# Patient Record
Sex: FEMALE | Race: WHITE | NOT HISPANIC OR LATINO | Employment: FULL TIME | URBAN - METROPOLITAN AREA
[De-identification: names, ages, dates, MRNs, and addresses within clinical notes are randomized per-mention and may not be internally consistent; named-entity substitution may affect disease eponyms.]

---

## 2019-03-11 ENCOUNTER — OFFICE VISIT (OUTPATIENT)
Dept: INTERNAL MEDICINE | Facility: CLINIC | Age: 24
End: 2019-03-11
Payer: COMMERCIAL

## 2019-03-11 VITALS
HEART RATE: 102 BPM | BODY MASS INDEX: 22.66 KG/M2 | OXYGEN SATURATION: 99 % | WEIGHT: 127.9 LBS | HEIGHT: 63 IN | TEMPERATURE: 97.3 F | SYSTOLIC BLOOD PRESSURE: 108 MMHG | RESPIRATION RATE: 23 BRPM | DIASTOLIC BLOOD PRESSURE: 64 MMHG

## 2019-03-11 DIAGNOSIS — Z00.00 ROUTINE HISTORY AND PHYSICAL EXAMINATION OF ADULT: Primary | ICD-10-CM

## 2019-03-11 LAB — HGB BLD-MCNC: 13.1 G/DL (ref 11.7–15.7)

## 2019-03-11 PROCEDURE — 36415 COLL VENOUS BLD VENIPUNCTURE: CPT | Performed by: INTERNAL MEDICINE

## 2019-03-11 PROCEDURE — 80061 LIPID PANEL: CPT | Performed by: INTERNAL MEDICINE

## 2019-03-11 PROCEDURE — 85018 HEMOGLOBIN: CPT | Performed by: INTERNAL MEDICINE

## 2019-03-11 PROCEDURE — 80053 COMPREHEN METABOLIC PANEL: CPT | Performed by: INTERNAL MEDICINE

## 2019-03-11 PROCEDURE — 99385 PREV VISIT NEW AGE 18-39: CPT | Performed by: INTERNAL MEDICINE

## 2019-03-11 SDOH — HEALTH STABILITY: MENTAL HEALTH: HOW OFTEN DO YOU HAVE A DRINK CONTAINING ALCOHOL?: NEVER

## 2019-03-11 ASSESSMENT — ENCOUNTER SYMPTOMS
DIARRHEA: 0
FEVER: 0
HEMATURIA: 0
NAUSEA: 0
SHORTNESS OF BREATH: 0
FREQUENCY: 0
SORE THROAT: 0
DIZZINESS: 0
BREAST MASS: 0
NERVOUS/ANXIOUS: 0
MYALGIAS: 0
PARESTHESIAS: 0
HEARTBURN: 0
WEAKNESS: 0
ABDOMINAL PAIN: 0
DYSURIA: 0
COUGH: 0
PALPITATIONS: 0
JOINT SWELLING: 0
CHILLS: 0
ARTHRALGIAS: 0
HEMATOCHEZIA: 0
CONSTIPATION: 0

## 2019-03-11 ASSESSMENT — MIFFLIN-ST. JEOR: SCORE: 1291.34

## 2019-03-11 NOTE — NURSING NOTE
"/64   Pulse 128   Temp 97.3  F (36.3  C) (Oral)   Resp 23   Ht 1.588 m (5' 2.5\")   Wt 58 kg (127 lb 14.4 oz)   LMP 03/11/2019   SpO2 99%   BMI 23.02 kg/m    Patient in for annual female physical.  Ana Maria Batista CMA    "

## 2019-03-11 NOTE — LETTER
March 25, 2019      Karen Soliz  [id number]   [place] [address]        Dear ,    Your lab results came back within acceptable limits.     Resulted Orders   Hemoglobin   Result Value Ref Range    Hemoglobin 13.1 11.7 - 15.7 g/dL   Comprehensive metabolic panel   Result Value Ref Range    Sodium 142 133 - 144 mmol/L    Potassium 4.2 3.4 - 5.3 mmol/L    Chloride 109 94 - 109 mmol/L    Carbon Dioxide 29 20 - 32 mmol/L    Anion Gap 4 3 - 14 mmol/L    Glucose 75 70 - 99 mg/dL    Urea Nitrogen 11 7 - 30 mg/dL    Creatinine 0.54 0.52 - 1.04 mg/dL    GFR Estimate >90 >60 mL/min/[1.73_m2]      Comment:      Non  GFR Calc  Starting 12/18/2018, serum creatinine based estimated GFR (eGFR) will be   calculated using the Chronic Kidney Disease Epidemiology Collaboration   (CKD-EPI) equation.      GFR Estimate If Black >90 >60 mL/min/[1.73_m2]      Comment:       GFR Calc  Starting 12/18/2018, serum creatinine based estimated GFR (eGFR) will be   calculated using the Chronic Kidney Disease Epidemiology Collaboration   (CKD-EPI) equation.      Calcium 9.1 8.5 - 10.1 mg/dL    Bilirubin Total 0.3 0.2 - 1.3 mg/dL    Albumin 3.9 3.4 - 5.0 g/dL    Protein Total 7.2 6.8 - 8.8 g/dL    Alkaline Phosphatase 73 40 - 150 U/L    ALT 24 0 - 50 U/L    AST 22 0 - 45 U/L   Lipid panel reflex to direct LDL Fasting   Result Value Ref Range    Cholesterol 133 <200 mg/dL    Triglycerides 23 <150 mg/dL    HDL Cholesterol 60 >49 mg/dL    LDL Cholesterol Calculated 68 <100 mg/dL      Comment:      Desirable:       <100 mg/dl    Non HDL Cholesterol 73 <130 mg/dL       If you have any questions or concerns, please call the clinic at the number listed above.       Sincerely,        Melina Desouza MD

## 2019-03-11 NOTE — PROGRESS NOTES
SUBJECTIVE:   CC: Karen Soliz is an 24 year old woman who presents for preventive health visit.     Physical   Annual:     Getting at least 3 servings of Calcium per day:  Yes    Bi-annual eye exam:  Yes    Dental care twice a year:  NO    Sleep apnea or symptoms of sleep apnea:  None    Diet:  Low fat/cholesterol    Frequency of exercise:  2-3 days/week    Duration of exercise:  15-30 minutes    Taking medications regularly:  Yes    Medication side effects:  None    Additional concerns today:  No    PHQ-2 Total Score: 0    Pt declines pap smear today      Today's PHQ-2 Score:   PHQ-2 (  [employer]) 3/11/2019   Q1: Little interest or pleasure in doing things 0   Q2: Feeling down, depressed or hopeless 0   PHQ-2 Score 0   Q1: Little interest or pleasure in doing things Not at all   Q2: Feeling down, depressed or hopeless Not at all   PHQ-2 Score 0       Abuse: Current or Past(Physical, Sexual or Emotional)- No  Do you feel safe in your environment? No      History reviewed. No pertinent past medical history.    Past Surgical History:   Procedure Laterality Date      SECTION         No current outpatient medications on file.       Family History   Problem Relation Age of Onset     Diabetes Mother      Hypertension Mother      Hyperlipidemia Mother        Social History     Tobacco Use     Smoking status: Never Smoker     Smokeless tobacco: Former User   Substance Use Topics     Alcohol use: No     Frequency: Never     Alcohol Use 3/11/2019   If you drink alcohol do you typically have greater than 3 drinks per day OR greater than 7 drinks per week? Not Applicable   No flowsheet data found.    Reviewed orders with patient.  Reviewed health maintenance and updated orders accordingly - Yes       Mammogram not appropriate for this patient based on age.       History of abnormal Pap smear: NO - age 21-29 PAP every 3 years recommended     Reviewed and updated as needed this visit by clinical staff  Tobacco   "Allergies  Meds  Med Hx  Surg Hx  Fam Hx  Soc Hx        Reviewed and updated as needed this visit by Provider            Review of Systems   Constitutional: Negative for chills and fever.   HENT: Negative for congestion, ear pain and sore throat.    Eyes: Negative for visual disturbance.   Respiratory: Negative for cough and shortness of breath.    Cardiovascular: Negative for chest pain, palpitations and peripheral edema.   Gastrointestinal: Negative for abdominal pain, constipation, diarrhea, heartburn, hematochezia and nausea.   Breasts:  Negative for tenderness, breast mass and discharge.   Genitourinary: Negative for dysuria, frequency, genital sores, hematuria, pelvic pain, urgency, vaginal bleeding and vaginal discharge.   Musculoskeletal: Negative for arthralgias, joint swelling and myalgias.   Skin: Negative for rash.   Neurological: Negative for dizziness, weakness and paresthesias.   Psychiatric/Behavioral: Negative for mood changes. The patient is not nervous/anxious.         OBJECTIVE:   /64   Pulse 128   Temp 97.3  F (36.3  C) (Oral)   Resp 23   Ht 1.588 m (5' 2.5\")   Wt 58 kg (127 lb 14.4 oz)   LMP 03/11/2019   SpO2 99%   BMI 23.02 kg/m    Physical Exam  GENERAL: healthy, alert and no distress  EYES: Eyes grossly normal to inspection, PERRL and conjunctivae and sclerae normal  HENT: ear canals and TM's normal, nose and mouth without ulcers or lesions  NECK: no adenopathy, no asymmetry, masses, or scars and thyroid normal to palpation  RESP: lungs clear to auscultation - no rales, rhonchi or wheezes  BREAST: normal without masses, tenderness or nipple discharge and no palpable axillary masses or adenopathy  CV: regular rate and rhythm, normal S1 S2, no S3 or S4, no murmur, click or rub, no peripheral edema and peripheral pulses strong  ABDOMEN: soft, nontender, no hepatosplenomegaly, no masses and bowel sounds normal  MS: no gross musculoskeletal defects noted, no edema  NEURO: " "Normal strength and tone, mentation intact and speech normal  PSYCH: mentation appears normal, affect normal/bright      ASSESSMENT/PLAN:     (Z00.00) Routine history and physical examination of adult  (primary encounter diagnosis)  Plan: Hemoglobin, Comprehensive metabolic panel,         Lipid panel reflex to direct LDL Fasting            COUNSELING:  Reviewed preventive health counseling, as reflected in patient instructions       Regular exercise       Healthy diet/nutrition    BP Readings from Last 1 Encounters:   03/11/19 108/64     Estimated body mass index is 23.02 kg/m  as calculated from the following:    Height as of this encounter: 1.588 m (5' 2.5\").    Weight as of this encounter: 58 kg (127 lb 14.4 oz).         reports that  has never smoked. she has never used smokeless tobacco.      Counseling Resources:  ATP IV Guidelines  Pooled Cohorts Equation Calculator  Breast Cancer Risk Calculator  FRAX Risk Assessment  ICSI Preventive Guidelines  Dietary Guidelines for Americans, 2010  USDA's MyPlate  ASA Prophylaxis  Lung CA Screening    Melina Desouza MD  [facility]  "

## 2019-03-12 LAB
ALBUMIN SERPL-MCNC: 3.9 G/DL (ref 3.4–5)
ALP SERPL-CCNC: 73 U/L (ref 40–150)
ALT SERPL W P-5'-P-CCNC: 24 U/L (ref 0–50)
ANION GAP SERPL CALCULATED.3IONS-SCNC: 4 MMOL/L (ref 3–14)
AST SERPL W P-5'-P-CCNC: 22 U/L (ref 0–45)
BILIRUB SERPL-MCNC: 0.3 MG/DL (ref 0.2–1.3)
BUN SERPL-MCNC: 11 MG/DL (ref 7–30)
CALCIUM SERPL-MCNC: 9.1 MG/DL (ref 8.5–10.1)
CHLORIDE SERPL-SCNC: 109 MMOL/L (ref 94–109)
CHOLEST SERPL-MCNC: 133 MG/DL
CO2 SERPL-SCNC: 29 MMOL/L (ref 20–32)
CREAT SERPL-MCNC: 0.54 MG/DL (ref 0.52–1.04)
GFR SERPL CREATININE-BSD FRML MDRD: >90 ML/MIN/{1.73_M2}
GLUCOSE SERPL-MCNC: 75 MG/DL (ref 70–99)
HDLC SERPL-MCNC: 60 MG/DL
LDLC SERPL CALC-MCNC: 68 MG/DL
NONHDLC SERPL-MCNC: 73 MG/DL
POTASSIUM SERPL-SCNC: 4.2 MMOL/L (ref 3.4–5.3)
PROT SERPL-MCNC: 7.2 G/DL (ref 6.8–8.8)
SODIUM SERPL-SCNC: 142 MMOL/L (ref 133–144)
TRIGL SERPL-MCNC: 23 MG/DL

## 2019-03-26 ENCOUNTER — TELEPHONE (OUTPATIENT)
Dept: INTERNAL MEDICINE | Facility: CLINIC | Age: 24
End: 2019-03-26

## 2019-03-27 NOTE — TELEPHONE ENCOUNTER
Urine tests not completed at our clinic. Notified patient and she and  agreed.  Ana Maria Batista, CMA

## 2019-03-27 NOTE — TELEPHONE ENCOUNTER
Please call Karen as soon as possible regarding her labwork. She is very concerned about her urine tests/is she pregnant? Is all she kept asking. She can be reached at [phone].

## 2019-04-28 ENCOUNTER — NURSE TRIAGE (OUTPATIENT)
Dept: NURSING | Facility: CLINIC | Age: 24
End: 2019-04-28

## 2019-05-28 ENCOUNTER — OFFICE VISIT (OUTPATIENT)
Dept: INTERNAL MEDICINE | Facility: CLINIC | Age: 24
End: 2019-05-28
Payer: COMMERCIAL

## 2019-05-28 VITALS
DIASTOLIC BLOOD PRESSURE: 76 MMHG | BODY MASS INDEX: 22.32 KG/M2 | WEIGHT: 126 LBS | HEIGHT: 63 IN | TEMPERATURE: 98.6 F | SYSTOLIC BLOOD PRESSURE: 104 MMHG | OXYGEN SATURATION: 94 % | RESPIRATION RATE: 16 BRPM | HEART RATE: 66 BPM

## 2019-05-28 DIAGNOSIS — M54.42 ACUTE BILATERAL LOW BACK PAIN WITH LEFT-SIDED SCIATICA: Primary | ICD-10-CM

## 2019-05-28 DIAGNOSIS — R63.4 WEIGHT LOSS: ICD-10-CM

## 2019-05-28 PROCEDURE — 99214 OFFICE O/P EST MOD 30 MIN: CPT | Performed by: NURSE PRACTITIONER

## 2019-05-28 ASSESSMENT — MIFFLIN-ST. JEOR: SCORE: 1282.72

## 2019-05-28 NOTE — PROGRESS NOTES
Subjective     Karen Soliz is a 24 year old female who presents to clinic today for the following health issues:    HPI   Back Pain       Duration: 2 weeks        Specific cause: none    Description:   Location of pain: low back bilateral  Character of pain: dull ache and waxing and waning  Pain radiation:radiates into the left buttocks and radiates into the left leg  New numbness or weakness in legs, not attributed to pain:  no     Intensity: moderate    History:   Pain interferes with job: YES, unable to stand long hours  History of back problems: recurrent self limited episodes of low back pain in the past  Any previous MRI or X-rays: Yes--at [facility].  Date 19  Sees a specialist for back pain:  No  Therapies tried without relief: prednisone x 1 week, some relief    Alleviating factors:   Improved by: see above      Precipitating factors:  Worsened by: Standing          Accompanying Signs & Symptoms:  Risk of Fracture:  None  Risk of Cauda Equina:  None  Risk of Infection:  None  Risk of Cancer:  None  Risk of Ankylosing Spondylitis:  Onset at age <35, male, AND morning back stiffness. no                Concern for wt loss      Duration: past few months    Description (location/character/radiation): pt reported wt loss, poor appetite.    Intensity:  mild    Accompanying signs and symptoms: none    History (similar episodes/previous evaluation): None    Precipitating or alleviating factors: None    Therapies tried and outcome: None       There is no problem list on file for this patient.    Past Surgical History:   Procedure Laterality Date      SECTION         Social History     Tobacco Use     Smoking status: Never Smoker     Smokeless tobacco: Never Used   Substance Use Topics     Alcohol use: No     Frequency: Never     Family History   Problem Relation Age of Onset     Diabetes Mother      Hypertension Mother      Hyperlipidemia Mother          No current outpatient medications on file.  "    BP Readings from Last 3 Encounters:   05/28/19 104/76   03/11/19 108/64    Wt Readings from Last 3 Encounters:   05/28/19 57.2 kg (126 lb)   03/11/19 58 kg (127 lb 14.4 oz)                      Reviewed and updated as needed this visit by Provider         Review of Systems   ROS COMP: Constitutional, HEENT, cardiovascular, pulmonary, gi and gu systems are negative, except as otherwise noted.      Objective    /76 (BP Location: Right arm, Patient Position: Sitting, Cuff Size: Adult Regular)   Pulse 66   Temp 98.6  F (37  C) (Oral)   Resp 16   Ht 1.588 m (5' 2.5\")   Wt 57.2 kg (126 lb)   LMP 05/10/2019 (Exact Date)   SpO2 94%   BMI 22.68 kg/m    Body mass index is 22.68 kg/m .  Physical Exam   GENERAL: healthy, alert and no distress  BACK: no CVA tenderness, no paralumbar tenderness  Negative SLR, DTR intact            Assessment & Plan      (M54.42) Acute bilateral low back pain with left-sided sciatica  (primary encounter diagnosis)  Comment:   Plan: NSAIDs, heat, has f/u with spine specialist through [facility]    (R63.4) Weight loss  Comment:   Plan: reassured, minimal wt flucuation.  Healthy diet, regular exercise.            Michelle Snyder NP  [facility]        "

## 2019-11-07 ENCOUNTER — OFFICE VISIT (OUTPATIENT)
Dept: INTERNAL MEDICINE | Facility: CLINIC | Age: 24
End: 2019-11-07
Payer: COMMERCIAL

## 2019-11-07 VITALS
OXYGEN SATURATION: 98 % | SYSTOLIC BLOOD PRESSURE: 98 MMHG | WEIGHT: 135.3 LBS | HEIGHT: 63 IN | HEART RATE: 82 BPM | RESPIRATION RATE: 16 BRPM | TEMPERATURE: 98.3 F | DIASTOLIC BLOOD PRESSURE: 62 MMHG | BODY MASS INDEX: 23.97 KG/M2

## 2019-11-07 DIAGNOSIS — L20.9 ATOPIC DERMATITIS, UNSPECIFIED TYPE: Primary | ICD-10-CM

## 2019-11-07 PROCEDURE — 99213 OFFICE O/P EST LOW 20 MIN: CPT | Performed by: NURSE PRACTITIONER

## 2019-11-07 RX ORDER — CLOTRIMAZOLE AND BETAMETHASONE DIPROPIONATE 10; .64 MG/G; MG/G
CREAM TOPICAL 2 TIMES DAILY
Qty: 45 G | Refills: 1 | Status: SHIPPED | OUTPATIENT
Start: 2019-11-07 | End: 2020-06-23

## 2019-11-07 RX ORDER — HYDROXYZINE PAMOATE 50 MG/1
50 CAPSULE ORAL 3 TIMES DAILY PRN
Qty: 30 CAPSULE | Refills: 0 | Status: SHIPPED | OUTPATIENT
Start: 2019-11-07 | End: 2020-06-23

## 2019-11-07 ASSESSMENT — MIFFLIN-ST. JEOR: SCORE: 1324.91

## 2019-11-07 NOTE — NURSING NOTE
"patient states that she has been having itching in vaginal/inner thigh area for 2 weeks.  BP 98/62 (BP Location: Right arm, Patient Position: Sitting, Cuff Size: Adult Regular)   Pulse 82   Temp 98.3  F (36.8  C) (Oral)   Resp 16   Ht 1.588 m (5' 2.5\")   Wt 61.4 kg (135 lb 4.8 oz)   LMP 10/20/2019 (Approximate)   SpO2 98%   BMI 24.35 kg/m      "

## 2019-11-07 NOTE — PROGRESS NOTES
"Subjective     Karen Soliz is a 24 year old female who presents to clinic today for the following health issues:    HPI   Itchy skin      Duration: 1 month    Description (location/character/radiation): itching lower abdomin and vulva    Intensity:  mild, moderate    Accompanying signs and symptoms: no rash, vag discharge    History (similar episodes/previous evaluation): None    Precipitating or alleviating factors: None, pt feels it has something to do with her scar from  3 years ago.    Therapies tried and outcome: None         There is no problem list on file for this patient.    Past Surgical History:   Procedure Laterality Date      SECTION         Social History     Tobacco Use     Smoking status: Never Smoker     Smokeless tobacco: Never Used   Substance Use Topics     Alcohol use: No     Frequency: Never     Family History   Problem Relation Age of Onset     Diabetes Mother      Hypertension Mother      Hyperlipidemia Mother          Current Outpatient Medications   Medication Sig Dispense Refill     clotrimazole-betamethasone (LOTRISONE) 1-0.05 % external cream Apply topically 2 times daily 45 g 1     hydrOXYzine (VISTARIL) 50 MG capsule Take 1 capsule (50 mg) by mouth 3 times daily as needed for itching 30 capsule 0     BP Readings from Last 3 Encounters:   19 98/62   19 104/76   19 108/64    Wt Readings from Last 3 Encounters:   19 61.4 kg (135 lb 4.8 oz)   19 57.2 kg (126 lb)   19 58 kg (127 lb 14.4 oz)                      Reviewed and updated as needed this visit by Provider         Review of Systems   ROS COMP: Constitutional, HEENT, cardiovascular, pulmonary, gi and gu systems are negative, except as otherwise noted.      Objective    BP 98/62 (BP Location: Right arm, Patient Position: Sitting, Cuff Size: Adult Regular)   Pulse 82   Temp 98.3  F (36.8  C) (Oral)   Resp 16   Ht 1.588 m (5' 2.5\")   Wt 61.4 kg (135 lb 4.8 oz)   LMP 10/20/2019 " (Approximate)   SpO2 98%   BMI 24.35 kg/m    Body mass index is 24.35 kg/m .  Physical Exam   GENERAL: healthy, alert and no distress  SKIN: no suspicious lesions or rashes to lower abd, groins, vulva            Assessment & Plan       ICD-10-CM    1. Atopic dermatitis, unspecified type L20.9 clotrimazole-betamethasone (LOTRISONE) 1-0.05 % external cream     hydrOXYzine (VISTARIL) 50 MG capsule          Gentle soaps, moisturizers after bathing  Call if not improving.    Michelle Snyder, DERECK  [facility]

## 2020-04-26 ENCOUNTER — NURSE TRIAGE (OUTPATIENT)
Dept: NURSING | Facility: CLINIC | Age: 25
End: 2020-04-26

## 2020-04-26 NOTE — TELEPHONE ENCOUNTER
Tooth pain - HA, sharp pain around tooth. 10/10.     Per protocol - advised ED evaluation.    COVID 19 Nurse Triage Plan/Patient Instructions    Please be aware that novel coronavirus (COVID-19) may be circulating in the community. If you develop symptoms such as fever, cough, or SOB or if you have concerns about the presence of another infection including coronavirus (COVID-19), please contact your health care provider or visit [email or web address].     Disposition/Instructions    Patient to go to ED and follow protocol based instructions. Follow System Ambulatory Workflow for COVID 19.     Bring Your Own Device:  Please also bring your smart device(s) (smart phones, tablets, laptops) and their charging cables for your personal use and to communicate with your care team during your visit.    Thank you for limiting contact with others, wearing a simple mask to cover your cough, practice good hand hygiene habits and accessing our virtual services where possible to limit the spread of this virus.    For more information about COVID19 and options for caring for yourself at home, please visit the CDC website at https://www.cdc.gov/coronavirus/2019-ncov/about/steps-when-sick.html  For more options for care at [facility], please visit our website at [email or web address]    For more information, please use the [place] Department of Health COVID-19 Website: https://www.health.[facility].[place]/diseases/coronavirus/index.html  [place] Department of Health ([facility]) COVID-19 Hotlines (Interpreters available):      Health questions: Phone Number: [phone] or [phone] and Hours: 7 a.m. to 7 p.m.    Schools and  questions: Phone Number: [phone] or [phone] and Hours 7 a.m. to 7 p.m.    Ilana Baxter RN on 4/26/2020 at 11:06 AM    Reason for Disposition    [1] SEVERE pain (e.g., excruciating, unable to do any normal activities) AND [2] not improved 2 hours after pain  "medicine    Additional Information    Negative: Shock suspected (e.g., cold/pale/clammy skin, too weak to stand, low BP, rapid pulse)    Negative: [1] Similar pain previously AND [2] it was from \"heart attack\"    Negative: [1] Similar pain previously AND [2] it was from \"angina\" AND [3] not relieved by nitroglycerin    Negative: Sounds like a life-threatening emergency to the triager    Negative: Chest pain    Negative: Toothache followed tooth injury    Negative: Toothache or mouth pain after tooth extraction    Negative: Canker sore (i.e., aphthous ulcer)    Negative: Tongue is very swollen and tender    Negative: [1] Face is swollen AND [2] fever    Negative: Patient sounds very sick or weak to the triager    Protocols used: TOOTHACHE-A-AH      "

## 2020-06-23 ENCOUNTER — OFFICE VISIT (OUTPATIENT)
Dept: INTERNAL MEDICINE | Facility: CLINIC | Age: 25
End: 2020-06-23
Payer: COMMERCIAL

## 2020-06-23 ENCOUNTER — ANCILLARY PROCEDURE (OUTPATIENT)
Dept: GENERAL RADIOLOGY | Facility: CLINIC | Age: 25
End: 2020-06-23
Attending: INTERNAL MEDICINE
Payer: COMMERCIAL

## 2020-06-23 VITALS
HEART RATE: 66 BPM | BODY MASS INDEX: 26.82 KG/M2 | OXYGEN SATURATION: 98 % | WEIGHT: 149 LBS | SYSTOLIC BLOOD PRESSURE: 122 MMHG | DIASTOLIC BLOOD PRESSURE: 73 MMHG | RESPIRATION RATE: 16 BRPM

## 2020-06-23 DIAGNOSIS — M79.662 PAIN OF LEFT LOWER LEG: ICD-10-CM

## 2020-06-23 DIAGNOSIS — M79.662 PAIN OF LEFT LOWER LEG: Primary | ICD-10-CM

## 2020-06-23 PROCEDURE — 99214 OFFICE O/P EST MOD 30 MIN: CPT | Performed by: INTERNAL MEDICINE

## 2020-06-23 PROCEDURE — 73590 X-RAY EXAM OF LOWER LEG: CPT | Mod: LT

## 2020-06-23 RX ORDER — CEPHALEXIN 500 MG/1
500 CAPSULE ORAL 2 TIMES DAILY
Qty: 20 CAPSULE | Refills: 0 | Status: SHIPPED | OUTPATIENT
Start: 2020-06-23 | End: 2020-12-10

## 2020-06-23 NOTE — PROGRESS NOTES
Patient's instructions / PLAN:                                                        Plan:  1.  XRay today - [address]   2. Ortho referral. . Please call [phone] to make an appointment    3. Try to keep leg elevated  4. Cephalexin 500 mg twice a day - antibiotic       ASSESSMENT & PLAN:                                                      (A87.215) Pain of left lower leg  (primary encounter diagnosis)  Comment: I wonder if she has a mild infection in the hematoma - Keflex  Plan: XR Tibia & Fibula Left 2 Views, Orthopedic &         Spine  Referral               Chief Complaint:                                                        L LE pain    SUBJECTIVE:                                                    History of present illness     L LE pain  -- she fell about 2 months ago, she had laceration on the ant L lower leg. The laceration heals but she states that the swelling persists and it gets worse and painful if she walks a lot. She states she is worried about the leg and infection. No fever  --exam: scar of the old laceration and it looks healed very well. There is a hematoma on the anterior shin, mild tender. No cellulitis.     ROS:                                                      ROS: negative for fever, chills, cough, wheezes, chest pain, shortness of breath, vomiting, abdominal pain, Pos for leg swelling - as above         OBJECTIVE:                                                    Physical Exam :    Blood pressure 122/73, pulse 66, resp. rate 16, weight 67.6 kg (149 lb), SpO2 98 %, not currently breastfeeding.   NAD, appears comfortable  Skin: no rashes   Chest: clear to auscultation bilaterally, good respiratory effort  Heart: S1 S2, RRR, no mgr appreciated  Abdomen: soft, not tender,   Extremities: --exam: scar of the old laceration and it looks healed very well. There is a hematoma on the anterior shin, mild tender. No cellulitis.   Neurologic: A, Ox3, no focal signs  appreciated    PMHx: reviewed  No past medical history on file.   PSHx: reviewed  Past Surgical History:   Procedure Laterality Date      SECTION          Meds: reviewed  No current outpatient medications on file.       Soc Hx: reviewed  Fam Hx: reviewed          Babita Wright MD  Internal Medicine

## 2020-06-23 NOTE — PATIENT INSTRUCTIONS
Plan:  1.  XRay today - [address]   2. Ortho referral. . Please call [phone] to make an appointment    3. Try to keep leg elevated  4. Cephalexin 500 mg twice a day - antibiotic

## 2020-06-30 ENCOUNTER — OFFICE VISIT (OUTPATIENT)
Dept: ORTHOPEDICS | Facility: CLINIC | Age: 25
End: 2020-06-30
Attending: INTERNAL MEDICINE
Payer: COMMERCIAL

## 2020-06-30 VITALS
DIASTOLIC BLOOD PRESSURE: 64 MMHG | SYSTOLIC BLOOD PRESSURE: 118 MMHG | HEIGHT: 63 IN | BODY MASS INDEX: 26.4 KG/M2 | WEIGHT: 149 LBS

## 2020-06-30 DIAGNOSIS — M79.662 PAIN OF LEFT LOWER LEG: ICD-10-CM

## 2020-06-30 DIAGNOSIS — S80.12XA CONTUSION OF LEFT LOWER LEG, INITIAL ENCOUNTER: Primary | ICD-10-CM

## 2020-06-30 PROCEDURE — 99243 OFF/OP CNSLTJ NEW/EST LOW 30: CPT | Performed by: FAMILY MEDICINE

## 2020-06-30 ASSESSMENT — MIFFLIN-ST. JEOR: SCORE: 1382.05

## 2020-06-30 NOTE — PROGRESS NOTES
"ASSESSMENT & PLAN    1. Contusion of left lower leg, initial encounter    2. Pain of left lower leg      Seen in consultation for left leg pain and swelling with subacute injury to the anterior tibia  No concerns for superfical or deep infection.  No concern for fracture.    Recommend ice and ace wrap (at night)  Ok to use Ibuprofen or Aleve as needed for pain  Ok to resume all desired activity  -----    SUBJECTIVE  Karen Soliz is a/an 25 year old female who is seen in consultation at the request of  Babita Mosqueda M.D. for evaluation of left lower leg pain. The patient is seen by themselves.    Onset: 4 month(s) ago. Patient describes injury as she was walking down a flight of stairs when she fell and hit her leg on the stair.  Location of Pain: left lower leg / shin  Rating of Pain at worst: 10/10  Rating of Pain Currently: 0/10 at rest  Worsened by: walking, exercise  Better with: rest  Treatments tried: rest/activity avoidance, ice, other medications: keflex (has only taken 2 days), and previous imaging (xray 6/23/20)  Associated symptoms: bruising, swelling  Orthopedic history: YES - h/o acute bilateral low back pain with left-sided sciatica Date: May 2019  Relevant surgical history: NO    Patient's past medical, surgical, social, and family histories were reviewed today and no pertinent history related to patient's presenting problem.    REVIEW OF SYSTEMS:  10 point ROS is negative other than symptoms noted above in HPI, Past Medical History or as stated below  Constitutional: NEGATIVE for fever, chills, change in weight  Skin: NEGATIVE for worrisome rashes, moles or lesions  GI/: NEGATIVE for bowel or bladder changes  Neuro: NEGATIVE for weakness, dizziness or paresthesias    OBJECTIVE:  /64   Ht 1.588 m (5' 2.5\")   Wt 67.6 kg (149 lb)   BMI 26.82 kg/m     General: healthy, alert and in no distress  HEENT: no scleral icterus or conjunctival erythema  Skin: no suspicious lesions or " rash. No jaundice.  CV: no pedal edema  Resp: normal respiratory effort without conversational dyspnea   Psych: normal mood and affect  Gait: normal steady gait with appropriate coordination and balance  MSK:  TIB/FIG  Inspection:    Soft tissue swelling over medial border of tibia - distal third, mildly tender to touch. No fluctuance/induration. No erythema/warmth. Healed laceration/abrasion anterior tibia.  Range of Motion:     Ankle with full range - nonpainful plantar and dorsiflexion    Independent visualization of the below image:  XR TIBIA & FIBULA LT 2 VW 6/23/2020 11:57 AM      HISTORY: trauma on ant hoffmann 2 m ago, still pain and swelling; Pain of  left lower leg                                                                IMPRESSION: Negative exam.     MD Ewa EDDY,  [facility]

## 2020-06-30 NOTE — PATIENT INSTRUCTIONS
1. Contusion of left lower leg, initial encounter    2. Pain of left lower leg      This is not infected and there is no fracture  Recommend ice and ace wrap (at night)  Ok to use Ibuprofen or Aleve as needed for pain  Ok to resume all desired activity

## 2020-06-30 NOTE — LETTER
6/30/2020         RE: Karen Soliz  [address] [address]  [address]  [facility] [address]        Dear Colleague,    Thank you for referring your patient, Karen Soliz, to the [facility] SPORTS MEDICINE. Please see a copy of my visit note below.    ASSESSMENT & PLAN    1. Contusion of left lower leg, initial encounter    2. Pain of left lower leg      Seen in consultation for left leg pain and swelling with subacute injury to the anterior tibia  No concerns for superfical or deep infection.  No concern for fracture.    Recommend ice and ace wrap (at night)  Ok to use Ibuprofen or Aleve as needed for pain  Ok to resume all desired activity  -----    SUBJECTIVE  Karen Soliz is a/an 25 year old female who is seen in consultation at the request of  Babita Mosqueda M.D. for evaluation of left lower leg pain. The patient is seen by themselves.    Onset: 4 month(s) ago. Patient describes injury as she was walking down a flight of stairs when she fell and hit her leg on the stair.  Location of Pain: left lower leg / shin  Rating of Pain at worst: 10/10  Rating of Pain Currently: 0/10 at rest  Worsened by: walking, exercise  Better with: rest  Treatments tried: rest/activity avoidance, ice, other medications: keflex (has only taken 2 days), and previous imaging (xray 6/23/20)  Associated symptoms: bruising, swelling  Orthopedic history: YES - h/o acute bilateral low back pain with left-sided sciatica Date: May 2019  Relevant surgical history: NO    Patient's past medical, surgical, social, and family histories were reviewed today and no pertinent history related to patient's presenting problem.    REVIEW OF SYSTEMS:  10 point ROS is negative other than symptoms noted above in HPI, Past Medical History or as stated below  Constitutional: NEGATIVE for fever, chills, change in weight  Skin: NEGATIVE for worrisome rashes, moles or lesions  GI/: NEGATIVE for bowel or bladder changes  Neuro: NEGATIVE for  "weakness, dizziness or paresthesias    OBJECTIVE:  /64   Ht 1.588 m (5' 2.5\")   Wt 67.6 kg (149 lb)   BMI 26.82 kg/m     General: healthy, alert and in no distress  HEENT: no scleral icterus or conjunctival erythema  Skin: no suspicious lesions or rash. No jaundice.  CV: no pedal edema  Resp: normal respiratory effort without conversational dyspnea   Psych: normal mood and affect  Gait: normal steady gait with appropriate coordination and balance  MSK:  TIB/FIG  Inspection:    Soft tissue swelling over medial border of tibia - distal third, mildly tender to touch. No fluctuance/induration. No erythema/warmth. Healed laceration/abrasion anterior tibia.  Range of Motion:     Ankle with full range - nonpainful plantar and dorsiflexion    Independent visualization of the below image:  XR TIBIA & FIBULA LT 2 VW 6/23/2020 11:57 AM      HISTORY: trauma on ant hoffmann 2 m ago, still pain and swelling; Pain of  left lower leg                                                                IMPRESSION: Negative exam.     MD Ewa EDDY DO [facility]      Again, thank you for allowing me to participate in the care of your patient.        Sincerely,        Ewa Odonnell,     "

## 2020-12-10 ENCOUNTER — OFFICE VISIT (OUTPATIENT)
Dept: INTERNAL MEDICINE | Facility: CLINIC | Age: 25
End: 2020-12-10
Payer: COMMERCIAL

## 2020-12-10 VITALS
WEIGHT: 144.4 LBS | OXYGEN SATURATION: 97 % | HEART RATE: 110 BPM | BODY MASS INDEX: 25.59 KG/M2 | RESPIRATION RATE: 16 BRPM | HEIGHT: 63 IN | DIASTOLIC BLOOD PRESSURE: 68 MMHG | SYSTOLIC BLOOD PRESSURE: 104 MMHG | TEMPERATURE: 97.8 F

## 2020-12-10 DIAGNOSIS — L70.0 ACNE VULGARIS: Primary | ICD-10-CM

## 2020-12-10 LAB
ERYTHROCYTE [DISTWIDTH] IN BLOOD BY AUTOMATED COUNT: 11.9 % (ref 10–15)
HCT VFR BLD AUTO: 41.6 % (ref 35–47)
HGB BLD-MCNC: 14 G/DL (ref 11.7–15.7)
MCH RBC QN AUTO: 32.7 PG (ref 26.5–33)
MCHC RBC AUTO-ENTMCNC: 33.7 G/DL (ref 31.5–36.5)
MCV RBC AUTO: 97 FL (ref 78–100)
PLATELET # BLD AUTO: 174 10E9/L (ref 150–450)
RBC # BLD AUTO: 4.28 10E12/L (ref 3.8–5.2)
VIT B12 SERPL-MCNC: 762 PG/ML (ref 193–986)
WBC # BLD AUTO: 5.6 10E9/L (ref 4–11)

## 2020-12-10 PROCEDURE — 80048 BASIC METABOLIC PNL TOTAL CA: CPT | Performed by: NURSE PRACTITIONER

## 2020-12-10 PROCEDURE — 82607 VITAMIN B-12: CPT | Performed by: NURSE PRACTITIONER

## 2020-12-10 PROCEDURE — 84207 ASSAY OF VITAMIN B-6: CPT | Mod: 90 | Performed by: NURSE PRACTITIONER

## 2020-12-10 PROCEDURE — 82306 VITAMIN D 25 HYDROXY: CPT | Performed by: NURSE PRACTITIONER

## 2020-12-10 PROCEDURE — 84425 ASSAY OF VITAMIN B-1: CPT | Mod: 90 | Performed by: NURSE PRACTITIONER

## 2020-12-10 PROCEDURE — 99000 SPECIMEN HANDLING OFFICE-LAB: CPT | Performed by: NURSE PRACTITIONER

## 2020-12-10 PROCEDURE — 36415 COLL VENOUS BLD VENIPUNCTURE: CPT | Performed by: NURSE PRACTITIONER

## 2020-12-10 PROCEDURE — 99213 OFFICE O/P EST LOW 20 MIN: CPT | Performed by: NURSE PRACTITIONER

## 2020-12-10 PROCEDURE — 85027 COMPLETE CBC AUTOMATED: CPT | Performed by: NURSE PRACTITIONER

## 2020-12-10 RX ORDER — TRETINOIN 0.25 MG/G
CREAM TOPICAL AT BEDTIME
Qty: 45 G | Refills: 1 | Status: SHIPPED | OUTPATIENT
Start: 2020-12-10 | End: 2021-08-12

## 2020-12-10 RX ORDER — DOXYCYCLINE HYCLATE 100 MG
100 TABLET ORAL 2 TIMES DAILY
Qty: 180 TABLET | Refills: 1 | Status: SHIPPED | OUTPATIENT
Start: 2020-12-10 | End: 2021-08-12

## 2020-12-10 ASSESSMENT — MIFFLIN-ST. JEOR: SCORE: 1361.18

## 2020-12-10 NOTE — PROGRESS NOTES
"Subjective     Karen Soliz is a 25 year old female who presents to clinic today for the following health issues:    HPI         2 y h/o facial acne, some large cystic  cetaphil cleanser, no make up  Derm in past had her on oral and topical meds, unsure of names  Concerned for possible allergies, vitamin deficiency contributing to acne  Request to see dermatologist specializing in acne/allergy  Requesting vitamin levels, anemia testing         Review of Systems   Constitutional, HEENT, cardiovascular, pulmonary, gi and gu systems are negative, except as otherwise noted.      Objective    /68 (BP Location: Right arm, Patient Position: Sitting, Cuff Size: Adult Large)   Pulse 110   Temp 97.8  F (36.6  C) (Oral)   Resp 16   Ht 1.588 m (5' 2.5\")   Wt 65.5 kg (144 lb 6.4 oz)   LMP 11/22/2020 (Exact Date)   SpO2 97%   BMI 25.99 kg/m    Body mass index is 25.99 kg/m .  Physical Exam   GENERAL: healthy, alert and no distress  SKIN: mil to moderate acne, small papules and pustules diffusely on face  PSYCH: mentation appears normal and anxious            Assessment & Plan     Acne vulgaris  \  - doxycycline hyclate (VIBRA-TABS) 100 MG tablet; Take 1 tablet (100 mg) by mouth 2 times daily  - tretinoin (RETIN-A) 0.025 % external cream; Apply topically At Bedtime  - CBC with platelets  - Basic metabolic panel  - Vitamin D Deficiency  - Vitamin B1 whole blood  - Vitamin B6  - Vitamin B12  - DERMATOLOGY ADULT REFERRAL; Future     BMI:   Estimated body mass index is 25.99 kg/m  as calculated from the following:    Height as of this encounter: 1.588 m (5' 2.5\").    Weight as of this encounter: 65.5 kg (144 lb 6.4 oz).            Labs pending  Doxy and retinA as directed  F/u derm    Michelle Snyder NP  [facility]    "

## 2020-12-10 NOTE — NURSING NOTE
"patient states that she started breaking out and is wondering is she is allerrgic to something.  Vital signs:  Temp: 97.8  F (36.6  C) Temp src: Oral BP: 104/68 Pulse: 110   Resp: 16 SpO2: 97 %     Height: 158.8 cm (5' 2.5\") Weight: 65.5 kg (144 lb 6.4 oz)  Estimated body mass index is 25.99 kg/m  as calculated from the following:    Height as of this encounter: 1.588 m (5' 2.5\").    Weight as of this encounter: 65.5 kg (144 lb 6.4 oz).          "

## 2020-12-10 NOTE — LETTER
December 15, 2020      Karen Soliz  [address] [facility]  [address] [place] [id number]        Dear ,    We are writing to inform you of your test results.    Your recent lab results were normal, except your Vitamin B 6 was elevated.  Please reevaluate any vitamin supplements you are taking.  Please follow up with your primary provider as needed.     Resulted Orders   CBC with platelets   Result Value Ref Range    WBC 5.6 4.0 - 11.0 10e9/L    RBC Count 4.28 3.8 - 5.2 10e12/L    Hemoglobin 14.0 11.7 - 15.7 g/dL    Hematocrit 41.6 35.0 - 47.0 %    MCV 97 78 - 100 fl    MCH 32.7 26.5 - 33.0 pg    MCHC 33.7 31.5 - 36.5 g/dL    RDW 11.9 10.0 - 15.0 %    Platelet Count 174 150 - 450 10e9/L   Basic metabolic panel   Result Value Ref Range    Sodium 139 133 - 144 mmol/L    Potassium 3.9 3.4 - 5.3 mmol/L    Chloride 108 94 - 109 mmol/L    Carbon Dioxide 25 20 - 32 mmol/L    Anion Gap 6 3 - 14 mmol/L    Glucose 80 70 - 99 mg/dL      Comment:      Fasting specimen    Urea Nitrogen 7 7 - 30 mg/dL    Creatinine 0.57 0.52 - 1.04 mg/dL    GFR Estimate >90 >60 mL/min/[1.73_m2]      Comment:      Non  GFR Calc  Starting 12/18/2018, serum creatinine based estimated GFR (eGFR) will be   calculated using the Chronic Kidney Disease Epidemiology Collaboration   (CKD-EPI) equation.      GFR Estimate If Black >90 >60 mL/min/[1.73_m2]      Comment:       GFR Calc  Starting 12/18/2018, serum creatinine based estimated GFR (eGFR) will be   calculated using the Chronic Kidney Disease Epidemiology Collaboration   (CKD-EPI) equation.      Calcium 8.9 8.5 - 10.1 mg/dL   Vitamin D Deficiency   Result Value Ref Range    Vitamin D Deficiency screening 41 20 - 75 ug/L      Comment:      Season, race, dietary intake, and treatment affect the concentration of   25-hydroxy-Vitamin D. Values may decrease during winter months and increase   during summer months. Values 20-29 ug/L may indicate Vitamin D  insufficiency   and values <20 ug/L may indicate Vitamin D deficiency.  Vitamin D determination is routinely performed by an immunoassay specific for   25 hydroxyvitamin D3.  If an individual is on vitamin D2 (ergocalciferol)   supplementation, please specify 25 OH vitamin D2 and D3 level determination by   LCMSMS test [id number].     Vitamin B1 whole blood   Result Value Ref Range    Vitamin B1 Whole Blood Level 130 70 - 180 nmol/L      Comment:      (Note)  INTERPRETIVE INFORMATION: Vitamin B1, Whole Blood  This assay measures the concentration of thiamine   diphosphate (TDP), the primary active form of vitamin B1.   Approximately 90 percent of vitamin B1 present in whole   blood is TDP. Thiamine and thiamine monophosphate, which   comprise the remaining 10 percent, are not measured.  Test developed and characteristics determined by [facility] [employer]. See Compliance Statement B: [employer] [employer]  Performed By: [facility] [employer]  [address] [place] [id number]  : Renate Beckwith MD     Vitamin B6   Result Value Ref Range    Vitamin B6 241.7 (H) 20.0 - 125.0 nmol/L      Comment:      (Note)  INTERPRETIVE INFORMATION: Vitamin B6 (Pyridoxal 5-Phosphate)  Pyridoxal 5'-phosphate measured in a specimen collected   following an 8-hour or overnight fast accurately indicates   vitamin B6 nutritional status. Non-fasting specimen   concentration reflects recent vitamin intake.  Test developed and characteristics determined by [facility]. See Compliance Statement B: [employer]  Performed By: [facility] [employer]  [address] [place] [address]  : Renate Beckwith MD     Vitamin B12   Result Value Ref Range    Vitamin B12 762 193 - 986 pg/mL       If you have any questions or concerns, please call the clinic at the number listed above.       Sincerely,      Michelle Snyder NP

## 2020-12-11 LAB
ANION GAP SERPL CALCULATED.3IONS-SCNC: 6 MMOL/L (ref 3–14)
BUN SERPL-MCNC: 7 MG/DL (ref 7–30)
CALCIUM SERPL-MCNC: 8.9 MG/DL (ref 8.5–10.1)
CHLORIDE SERPL-SCNC: 108 MMOL/L (ref 94–109)
CO2 SERPL-SCNC: 25 MMOL/L (ref 20–32)
CREAT SERPL-MCNC: 0.57 MG/DL (ref 0.52–1.04)
DEPRECATED CALCIDIOL+CALCIFEROL SERPL-MC: 41 UG/L (ref 20–75)
GFR SERPL CREATININE-BSD FRML MDRD: >90 ML/MIN/{1.73_M2}
GLUCOSE SERPL-MCNC: 80 MG/DL (ref 70–99)
POTASSIUM SERPL-SCNC: 3.9 MMOL/L (ref 3.4–5.3)
SODIUM SERPL-SCNC: 139 MMOL/L (ref 133–144)

## 2020-12-13 LAB — VIT B6 SERPL-MCNC: 241.7 NMOL/L (ref 20–125)

## 2020-12-14 LAB — VIT B1 BLD-MCNC: 130 NMOL/L (ref 70–180)

## 2020-12-15 ENCOUNTER — TELEPHONE (OUTPATIENT)
Dept: DERMATOLOGY | Facility: CLINIC | Age: 25
End: 2020-12-15

## 2020-12-15 NOTE — TELEPHONE ENCOUNTER
[facility] Call Center    Phone Message    May a detailed message be left on voicemail: no     Reason for Call: Other: `      If it is possible to schedule this visit as In Person, the Pt prefers to see the Dr. In person and not via video.    I explained the process and that video is currently protocol.    Please call Pt if a change to in person is possible.    Thank you.    Action Taken: Message routed to:  [facility] ([facility] Dermatology    Travel Screening: Not Applicable

## 2020-12-17 ENCOUNTER — OFFICE VISIT (OUTPATIENT)
Dept: DERMATOLOGY | Facility: CLINIC | Age: 25
End: 2020-12-17
Attending: NURSE PRACTITIONER
Payer: COMMERCIAL

## 2020-12-17 DIAGNOSIS — L70.0 ACNE VULGARIS: Primary | ICD-10-CM

## 2020-12-17 DIAGNOSIS — L70.9 ACNE, UNSPECIFIED ACNE TYPE: ICD-10-CM

## 2020-12-17 PROCEDURE — 99203 OFFICE O/P NEW LOW 30 MIN: CPT | Mod: GC | Performed by: DERMATOLOGY

## 2020-12-17 RX ORDER — SPIRONOLACTONE 50 MG/1
50 TABLET, FILM COATED ORAL 2 TIMES DAILY
Qty: 60 TABLET | Refills: 4 | Status: SHIPPED | OUTPATIENT
Start: 2020-12-17 | End: 2021-08-12

## 2020-12-17 RX ORDER — CLINDAMYCIN PHOSPHATE 10 UG/ML
LOTION TOPICAL DAILY
Qty: 60 ML | Refills: 4 | Status: SHIPPED | OUTPATIENT
Start: 2020-12-17 | End: 2021-08-12

## 2020-12-17 RX ORDER — TRETINOIN 0.5 MG/G
CREAM TOPICAL AT BEDTIME
Qty: 45 G | Refills: 4 | Status: SHIPPED | OUTPATIENT
Start: 2020-12-17 | End: 2021-08-12

## 2020-12-17 ASSESSMENT — PAIN SCALES - GENERAL: PAINLEVEL: NO PAIN (0)

## 2020-12-17 NOTE — LETTER
2020       RE: Karen Soliz  [address] [address] [address]     Dear Colleague,    Thank you for referring your patient, Karen Soliz, to the [facility]ERMATOLOGY [facility] [place] [facility]. Please see a copy of my visit note below.    [facility] Dermatology Note      Dermatology Problem List:  1. Acne vulgaris.   - spironolactone 50mg BID, tretinoin 0.05%, clindamycin lotion    Encounter Date: Dec 17, 2020    CC:   Chief Complaint   Patient presents with     Derm Problem     Patient is here today for acne          HPI:  Ms. Karen Soliz is a 25 year old female who presents to clinic today as a new patient for evaluation of  acne. Seen by Dr. Gamino in 2019. She is originally from [place] and she began to have acne since moving to [place]. She has been using tretinoin as a spot treatment and cera-ve as well. She does not want to take accutane. She expresses interest in allergy testing due to a burning sensation on the face sometimes.   She has multiple pink bumps and sometimes gets pus bumps on her face.      Past Medical, Social, Family History:   There is no problem list on file for this patient.    No past medical history on file.  Past Surgical History:   Procedure Laterality Date      SECTION       Family History   Problem Relation Age of Onset     Diabetes Mother      Hypertension Mother      Hyperlipidemia Mother      Social History:  Patient  reports that she has never smoked. She has never used smokeless tobacco. She reports that she does not drink alcohol or use drugs.    Medications:  Current Outpatient Medications   Medication Sig Dispense Refill     tretinoin (RETIN-A) 0.025 % external cream Apply topically At Bedtime 45 g 1     doxycycline hyclate (VIBRA-TABS) 100 MG tablet Take 1 tablet (100 mg) by mouth 2 times daily (Patient not taking: Reported on 2020) 180 tablet 1         Allergies:  No Known Allergies    ROS:  Constitutional: Otherwise feeling well today, in usual state of health.   Skin: As per HPI   No cough or SOB  No recent fevers    Physical exam:  Vitals: LMP 11/22/2020 (Exact Date)   GEN: This is a well developed, well-nourished female in no acute distress, in a pleasant mood.    PULM: Breathing comfortably in no distress  CV: Well-perfused, no cyanosis  EXTREMITIES: No deformity, no edema  SKIN:   Focused examination of the face was performed.  - There are superficial acneiform inflammatory papules, comedones, scarring, and post-inflammatory hyperpigmentation on the forehead, chin, and bilateral cheeks.   - No other lesions of concern on areas examined.     ASSESSMENT/PLAN:    # Acne vulgaris, mild-moderate inflammatory and comedonal.   - Start clindamycin lotion to the face in the morning   - Increase tretinoin 0.025% to 0.05% cream (pea-sized amount to whole face) nightly at bedtime. Side effects include dryness and irritation, thus recommend starting every other night for 1-2 weeks and working up to nightly thereafter. Follow this with a good non-comedogenic moisturizer, such as Cera-Ve, Marlene-cream, or Cetaphil.   - Start spironolactone 50mg BID. Discussed the need for using contraception during treatment and for one month following discontinuation due to possible teratogenic effect. Discussed side effect of polyuria, myalgia, muscle cramps, menstrual spotting.   - Will hold off on accutane discussion as patient is reluctant to initiate due to side effects   - Will discuss additional options for post-inflammatory hyperpigmentation at next visit     Follow-up in 2 months, earlier for new or changing lesions.     Patient was staffed with Dr. Ladarius Gómez MD  Dermatology Resident    I have seen and examined this patient and agree with the assessment and plan as documented in the resident's note.    Arcadio Durand MD  Dermatology Attending

## 2020-12-17 NOTE — PROGRESS NOTES
[facility] Dermatology Note      Dermatology Problem List:  1. Acne vulgaris.   - spironolactone 50mg BID, tretinoin 0.05%, clindamycin lotion    Encounter Date: Dec 17, 2020    CC:   Chief Complaint   Patient presents with     Derm Problem     Patient is here today for acne          HPI:  Ms. Karen Soliz is a 25 year old female who presents to clinic today as a new patient for evaluation of  acne. Seen by Dr. Gamino in 2019. She is originally from [place] and she began to have acne since moving to [place]. She has been using tretinoin as a spot treatment and cera-ve as well. She does not want to take accutane. She expresses interest in allergy testing due to a burning sensation on the face sometimes.   She has multiple pink bumps and sometimes gets pus bumps on her face.      Past Medical, Social, Family History:   There is no problem list on file for this patient.    No past medical history on file.  Past Surgical History:   Procedure Laterality Date      SECTION       Family History   Problem Relation Age of Onset     Diabetes Mother      Hypertension Mother      Hyperlipidemia Mother      Social History:  Patient  reports that she has never smoked. She has never used smokeless tobacco. She reports that she does not drink alcohol or use drugs.    Medications:  Current Outpatient Medications   Medication Sig Dispense Refill     tretinoin (RETIN-A) 0.025 % external cream Apply topically At Bedtime 45 g 1     doxycycline hyclate (VIBRA-TABS) 100 MG tablet Take 1 tablet (100 mg) by mouth 2 times daily (Patient not taking: Reported on 2020) 180 tablet 1        Allergies:  No Known Allergies    ROS:  Constitutional: Otherwise feeling well today, in usual state of health.   Skin: As per HPI   No cough or SOB  No recent fevers    Physical exam:  Vitals: LMP 2020 (Exact Date)   GEN: This is a well developed, well-nourished female in no acute distress, in a pleasant mood.     PULM: Breathing comfortably in no distress  CV: Well-perfused, no cyanosis  EXTREMITIES: No deformity, no edema  SKIN:   Focused examination of the face was performed.  - There are superficial acneiform inflammatory papules, comedones, scarring, and post-inflammatory hyperpigmentation on the forehead, chin, and bilateral cheeks.   - No other lesions of concern on areas examined.     ASSESSMENT/PLAN:    # Acne vulgaris, mild-moderate inflammatory and comedonal.   - Start clindamycin lotion to the face in the morning   - Increase tretinoin 0.025% to 0.05% cream (pea-sized amount to whole face) nightly at bedtime. Side effects include dryness and irritation, thus recommend starting every other night for 1-2 weeks and working up to nightly thereafter. Follow this with a good non-comedogenic moisturizer, such as Cera-Ve, Marlene-cream, or Cetaphil.   - Start spironolactone 50mg BID. Discussed the need for using contraception during treatment and for one month following discontinuation due to possible teratogenic effect. Discussed side effect of polyuria, myalgia, muscle cramps, menstrual spotting.   - Will hold off on accutane discussion as patient is reluctant to initiate due to side effects   - Will discuss additional options for post-inflammatory hyperpigmentation at next visit     Follow-up in 2 months, earlier for new or changing lesions.     Patient was staffed with Dr. Ladarius Gómez MD  Dermatology Resident    I have seen and examined this patient and agree with the assessment and plan as documented in the resident's note.    Arcadio Durand MD  Dermatology Attending

## 2020-12-17 NOTE — PATIENT INSTRUCTIONS
AM / morning:   - Start clindamycin lotion to the face in the morning   - Take 50mg tablet spironolactone     PM / bedtime:   - Apply tretinoin 0.05% cream (a pea-sized amount to the whole face)   - Then, apply good non-comedogenic moisturizer cream, such as Cera-Ve, Marlene-cream, or Cetaphil  - Take 50mg tablet spironolactone

## 2020-12-17 NOTE — NURSING NOTE
Chief Complaint   Patient presents with     Derm Problem     Patient is here today for acne      Monisha OROSCO CMA

## 2020-12-23 PROBLEM — L70.0 ACNE VULGARIS: Status: ACTIVE | Noted: 2020-12-23

## 2021-01-28 ENCOUNTER — HOSPITAL ENCOUNTER (EMERGENCY)
Facility: CLINIC | Age: 26
Discharge: LEFT BEFORE DISCHARGE | End: 2021-01-28
Admitting: EMERGENCY MEDICINE
Payer: COMMERCIAL

## 2021-01-28 VITALS
TEMPERATURE: 97.9 F | OXYGEN SATURATION: 100 % | SYSTOLIC BLOOD PRESSURE: 112 MMHG | RESPIRATION RATE: 19 BRPM | HEART RATE: 73 BPM | DIASTOLIC BLOOD PRESSURE: 67 MMHG

## 2021-01-28 PROCEDURE — 999N000104 HC STATISTIC NO CHARGE

## 2021-01-28 NOTE — ED TRIAGE NOTES
Presents after syncopal episode while taking mother to the doctor this AM. Pt states she was standing and woke up in a seated position in a chair at the clinic. Staff there recommended ED eval. VSS on RA. Denies any previous episode of syncope but report she hasn't been sleeping well.

## 2021-08-12 ENCOUNTER — OFFICE VISIT (OUTPATIENT)
Dept: INTERNAL MEDICINE | Facility: CLINIC | Age: 26
End: 2021-08-12
Payer: COMMERCIAL

## 2021-08-12 VITALS
HEART RATE: 68 BPM | WEIGHT: 143.4 LBS | RESPIRATION RATE: 16 BRPM | OXYGEN SATURATION: 100 % | BODY MASS INDEX: 25.41 KG/M2 | DIASTOLIC BLOOD PRESSURE: 65 MMHG | HEIGHT: 63 IN | SYSTOLIC BLOOD PRESSURE: 97 MMHG | TEMPERATURE: 97.4 F

## 2021-08-12 DIAGNOSIS — Z11.59 NEED FOR HEPATITIS C SCREENING TEST: ICD-10-CM

## 2021-08-12 DIAGNOSIS — K21.00 GASTROESOPHAGEAL REFLUX DISEASE WITH ESOPHAGITIS WITHOUT HEMORRHAGE: Primary | ICD-10-CM

## 2021-08-12 DIAGNOSIS — R10.13 EPIGASTRIC PAIN: ICD-10-CM

## 2021-08-12 DIAGNOSIS — Z11.4 ENCOUNTER FOR SCREENING FOR HIV: ICD-10-CM

## 2021-08-12 DIAGNOSIS — A04.8 H. PYLORI INFECTION: ICD-10-CM

## 2021-08-12 LAB
BASOPHILS # BLD AUTO: 0 10E3/UL (ref 0–0.2)
BASOPHILS NFR BLD AUTO: 0 %
EOSINOPHIL # BLD AUTO: 0.3 10E3/UL (ref 0–0.7)
EOSINOPHIL NFR BLD AUTO: 4 %
ERYTHROCYTE [DISTWIDTH] IN BLOOD BY AUTOMATED COUNT: 11.6 % (ref 10–15)
HCT VFR BLD AUTO: 41.3 % (ref 35–47)
HGB BLD-MCNC: 14 G/DL (ref 11.7–15.7)
LYMPHOCYTES # BLD AUTO: 1.9 10E3/UL (ref 0.8–5.3)
LYMPHOCYTES NFR BLD AUTO: 26 %
MCH RBC QN AUTO: 33.5 PG (ref 26.5–33)
MCHC RBC AUTO-ENTMCNC: 33.9 G/DL (ref 31.5–36.5)
MCV RBC AUTO: 99 FL (ref 78–100)
MONOCYTES # BLD AUTO: 0.7 10E3/UL (ref 0–1.3)
MONOCYTES NFR BLD AUTO: 10 %
NEUTROPHILS # BLD AUTO: 4.2 10E3/UL (ref 1.6–8.3)
NEUTROPHILS NFR BLD AUTO: 59 %
PLATELET # BLD AUTO: 174 10E3/UL (ref 150–450)
RBC # BLD AUTO: 4.18 10E6/UL (ref 3.8–5.2)
WBC # BLD AUTO: 7 10E3/UL (ref 4–11)

## 2021-08-12 PROCEDURE — 80053 COMPREHEN METABOLIC PANEL: CPT | Performed by: NURSE PRACTITIONER

## 2021-08-12 PROCEDURE — 36415 COLL VENOUS BLD VENIPUNCTURE: CPT | Performed by: NURSE PRACTITIONER

## 2021-08-12 PROCEDURE — 99213 OFFICE O/P EST LOW 20 MIN: CPT | Performed by: NURSE PRACTITIONER

## 2021-08-12 PROCEDURE — 85025 COMPLETE CBC W/AUTO DIFF WBC: CPT | Performed by: NURSE PRACTITIONER

## 2021-08-12 PROCEDURE — 86803 HEPATITIS C AB TEST: CPT | Performed by: NURSE PRACTITIONER

## 2021-08-12 PROCEDURE — 87389 HIV-1 AG W/HIV-1&-2 AB AG IA: CPT | Performed by: NURSE PRACTITIONER

## 2021-08-12 ASSESSMENT — MIFFLIN-ST. JEOR: SCORE: 1351.65

## 2021-08-12 NOTE — PROGRESS NOTES
"    Assessment & Plan     Gastroesophageal reflux disease with esophagitis without hemorrhage  Needs further evaluation   - Helicobacter pylori Antigen Stool; Future  - CBC with platelets and differential  - Comprehensive metabolic panel (BMP + Alb, Alk Phos, ALT, AST, Total. Bili, TP)  - omeprazole (PRILOSEC) 20 MG DR capsule; Take 1 capsule (20 mg) by mouth daily  - Helicobacter pylori Antigen Stool    Epigastric pain    - Helicobacter pylori Antigen Stool; Future  - CBC with platelets and differential  - Comprehensive metabolic panel (BMP + Alb, Alk Phos, ALT, AST, Total. Bili, TP)  - omeprazole (PRILOSEC) 20 MG DR capsule; Take 1 capsule (20 mg) by mouth daily  - Helicobacter pylori Antigen Stool    Need for hepatitis C screening test    - Hepatitis C Screen Reflex to HCV RNA Quant and Genotype    Encounter for screening for HIV    - HIV Antigen Antibody Combo      18 minutes spent on the date of the encounter doing chart review, history and exam, documentation and further activities per the note       BMI:   Estimated body mass index is 25.81 kg/m  as calculated from the following:    Height as of this encounter: 1.588 m (5' 2.5\").    Weight as of this encounter: 65 kg (143 lb 6.4 oz).       Patient Instructions   Lab in [address]    Stool test     Omeprazole twice daily for a month and then as needed      Return in about 4 weeks (around 9/9/2021) for depending .    CLAIRE Parson [facility] JUAN LUIS Jones is a 26 year old who presents for the following health issues     HPI     Worsening epigastric pain after eating for 2 months.  Stomach issues   Feels her stomach is making acid   Hard to eat   Feels hot and cold at times  No measured temperature- but feels hot   Can only eat one meal a day - feels stuffed   Cannot eat spicy     Used to stool well - no stool in past couple days and stooled to day   No blood in stool     Feels nausea at times      Was positive for " "h pylori 2013 in [place]        Review of Systems   Constitutional, HEENT, cardiovascular, pulmonary, GI, , musculoskeletal, neuro, skin, endocrine and psych systems are negative, except as otherwise noted.      Objective    BP 97/65 (BP Location: Left arm, Patient Position: Chair, Cuff Size: Adult Regular)   Pulse 68   Temp 97.4  F (36.3  C) (Oral)   Resp 16   Ht 1.588 m (5' 2.5\")   Wt 65 kg (143 lb 6.4 oz)   LMP 08/11/2021   SpO2 100%   Breastfeeding No   BMI 25.81 kg/m    Body mass index is 25.81 kg/m .  Physical Exam   GENERAL: alert and no distress  RESP: lungs clear to auscultation - no rales, rhonchi or wheezes  CV: regular rate and rhythm  ABDOMEN: soft, epigastric tender, no hepatosplenomegaly, no masses and bowel sounds normal  PSYCH: mentation appears normal, affect normal/bright    Lab   Stool             "

## 2021-08-13 LAB
ALBUMIN SERPL-MCNC: 4 G/DL (ref 3.4–5)
ALP SERPL-CCNC: 83 U/L (ref 40–150)
ALT SERPL W P-5'-P-CCNC: 28 U/L (ref 0–50)
ANION GAP SERPL CALCULATED.3IONS-SCNC: 3 MMOL/L (ref 3–14)
AST SERPL W P-5'-P-CCNC: 23 U/L (ref 0–45)
BILIRUB SERPL-MCNC: 0.4 MG/DL (ref 0.2–1.3)
BUN SERPL-MCNC: 9 MG/DL (ref 7–30)
CALCIUM SERPL-MCNC: 9.1 MG/DL (ref 8.5–10.1)
CHLORIDE BLD-SCNC: 108 MMOL/L (ref 94–109)
CO2 SERPL-SCNC: 26 MMOL/L (ref 20–32)
CREAT SERPL-MCNC: 0.64 MG/DL (ref 0.52–1.04)
GFR SERPL CREATININE-BSD FRML MDRD: >90 ML/MIN/1.73M2
GLUCOSE BLD-MCNC: 68 MG/DL (ref 70–99)
HCV AB SERPL QL IA: NONREACTIVE
HIV 1+2 AB+HIV1 P24 AG SERPL QL IA: NONREACTIVE
POTASSIUM BLD-SCNC: 4.2 MMOL/L (ref 3.4–5.3)
PROT SERPL-MCNC: 7.9 G/DL (ref 6.8–8.8)
SODIUM SERPL-SCNC: 137 MMOL/L (ref 133–144)

## 2021-08-24 ENCOUNTER — TELEPHONE (OUTPATIENT)
Dept: INTERNAL MEDICINE | Facility: CLINIC | Age: 26
End: 2021-08-24

## 2021-08-24 PROCEDURE — 87338 HPYLORI STOOL AG IA: CPT | Performed by: NURSE PRACTITIONER

## 2021-08-24 NOTE — TELEPHONE ENCOUNTER
Patient informed Alayna has not commented on labs from 8/12, but they looked okay. Stool test for H pylori will take 1-2 days to come back. Patient states Omeprazole is not helping and symptoms are getting worse. Advised patient that if stool test is negative, we could check with provider for recommendations on next steps since her symptoms are not improving.     Ana Maria Martinez RN  [facility]

## 2021-08-24 NOTE — TELEPHONE ENCOUNTER
Patient calling for results of labs done on 8/12/21. She is also wanting to know how long it take for the stool test to come back. Call her at [phone] (home)

## 2021-08-26 LAB — H PYLORI AG STL QL IA: POSITIVE

## 2021-08-27 ENCOUNTER — TELEPHONE (OUTPATIENT)
Dept: INTERNAL MEDICINE | Facility: CLINIC | Age: 26
End: 2021-08-27

## 2021-08-27 DIAGNOSIS — R10.13 EPIGASTRIC PAIN: ICD-10-CM

## 2021-08-27 DIAGNOSIS — A04.8 H. PYLORI INFECTION: Primary | ICD-10-CM

## 2021-08-27 DIAGNOSIS — K21.00 GASTROESOPHAGEAL REFLUX DISEASE WITH ESOPHAGITIS WITHOUT HEMORRHAGE: ICD-10-CM

## 2021-08-27 RX ORDER — CLARITHROMYCIN 500 MG
500 TABLET ORAL 2 TIMES DAILY
Qty: 28 TABLET | Refills: 0 | Status: SHIPPED | OUTPATIENT
Start: 2021-08-27 | End: 2021-12-02

## 2021-08-27 RX ORDER — AMOXICILLIN 500 MG/1
1000 CAPSULE ORAL 2 TIMES DAILY
Qty: 56 CAPSULE | Refills: 0 | Status: SHIPPED | OUTPATIENT
Start: 2021-08-27 | End: 2021-09-10

## 2021-08-27 NOTE — TELEPHONE ENCOUNTER
Patient calls for results of H pylori test ordered by Alayna. H pylori test came back positive. Routed to Dr. Ireland to review as well since Alayna is out of the office. Please call patient back when treatment has been sent.    Ana Maria Martinez RN  [facility]

## 2021-08-27 NOTE — TELEPHONE ENCOUNTER
Patient advised prescriptions were sent to pharmacy. She verbalizes understanding.    Ana Maria Martinez RN  [facility]

## 2021-08-27 NOTE — TELEPHONE ENCOUNTER
Please advise pt.:    E-prescribed Rx's:    Omeprazole 20mg BID  X 14d, then once daily  Biaxin 500 mg BID x 14d  Amoxicillin 1000 mg BID x 14d

## 2021-09-23 DIAGNOSIS — A04.8 H. PYLORI INFECTION: Primary | ICD-10-CM

## 2021-10-27 ENCOUNTER — OFFICE VISIT (OUTPATIENT)
Dept: URGENT CARE | Facility: URGENT CARE | Age: 26
End: 2021-10-27
Payer: COMMERCIAL

## 2021-10-27 VITALS
WEIGHT: 150 LBS | SYSTOLIC BLOOD PRESSURE: 113 MMHG | OXYGEN SATURATION: 98 % | HEART RATE: 95 BPM | TEMPERATURE: 99.4 F | BODY MASS INDEX: 27 KG/M2 | DIASTOLIC BLOOD PRESSURE: 74 MMHG | RESPIRATION RATE: 18 BRPM

## 2021-10-27 DIAGNOSIS — Z20.822 SUSPECTED COVID-19 VIRUS INFECTION: Primary | ICD-10-CM

## 2021-10-27 DIAGNOSIS — R07.0 THROAT PAIN: ICD-10-CM

## 2021-10-27 LAB — DEPRECATED S PYO AG THROAT QL EIA: NEGATIVE

## 2021-10-27 PROCEDURE — U0003 INFECTIOUS AGENT DETECTION BY NUCLEIC ACID (DNA OR RNA); SEVERE ACUTE RESPIRATORY SYNDROME CORONAVIRUS 2 (SARS-COV-2) (CORONAVIRUS DISEASE [COVID-19]), AMPLIFIED PROBE TECHNIQUE, MAKING USE OF HIGH THROUGHPUT TECHNOLOGIES AS DESCRIBED BY CMS-2020-01-R: HCPCS | Performed by: PHYSICIAN ASSISTANT

## 2021-10-27 PROCEDURE — 99214 OFFICE O/P EST MOD 30 MIN: CPT | Performed by: PHYSICIAN ASSISTANT

## 2021-10-27 PROCEDURE — 87651 STREP A DNA AMP PROBE: CPT | Performed by: PHYSICIAN ASSISTANT

## 2021-10-27 PROCEDURE — U0005 INFEC AGEN DETEC AMPLI PROBE: HCPCS | Performed by: PHYSICIAN ASSISTANT

## 2021-10-28 LAB
GROUP A STREP BY PCR: NOT DETECTED
SARS-COV-2 RNA RESP QL NAA+PROBE: NEGATIVE

## 2021-10-28 NOTE — PATIENT INSTRUCTIONS
Follow up immediately with severe headache, chest pain, or shortness of breath    Rest, isolate for 10 days, hydrate, follow up if worsening or new symptoms  Household members to isolate until test results, if positive isolate for 2 weeks and follow up for testing if symptoms occur         Patient Education     Coronavirus Disease 2019 (COVID-19): Caring for Yourself or Others   If you or a household member have symptoms of COVID-19, follow the guidelines below. This will help you manage symptoms and keep the virus from spreading.  If you have symptoms of COVID-19    Stay home and contact your care team. They will tell you what to do.    Don t panic. Keep in mind that other illnesses can cause similar symptoms.    Stay away from work, school, and public places.    Limit physical contact with others in your home. Limit visitors. No kissing.  Clean surfaces you touch with disinfectant.  If you need to cough or sneeze, do it into a tissue. Then throw the tissue into the trash. If you don't have tissues, cough or sneeze into the bend of your elbow.  Don t share food or personal items with people in your household. This includes items like eating and drinking utensils, towels, and bedding.  Wear a cloth face mask around other people. During a public health emergency, medical face masks may be reserved for healthcare workers. You may need to make a cloth face mask of your own. You can do this using a bandana, T-shirt, or other cloth. The CDC has instructions on how to make a face mask. Wear the mask so that it covers both your nose and mouth.  If you need to go to a hospital or clinic, call ahead to let them know. Expect the care team to wear masks, gowns, gloves, and eye protection. You may need to come to a different entrance or wait in a separate room.  Follow all instructions from your care team.    If you ve been diagnosed with COVID-19    Stay home and away from others, including other people in your home. (This is  called self-isolation.) Don t leave home unless you need to get medical care. Don t go to work, school, or public places. Don t use buses, taxis, or other public transportation.    Follow all instructions from your care team.    If you need to go to a hospital or clinic, call ahead to let them know. Expect the care team to wear masks, gowns, gloves, and eye protection. You may need to come to a different entrance or wait in a separate room.    Wear a face mask over your nose and mouth. This is to protect others from your germs. If you can t wear a mask, your caregivers should wear one. You may need to make your own mask using a bandana, T-shirt, or other cloth. See the CDC s instructions on how to make a face mask.    Have no contact with pets and other animals.    Don t share food or personal items with people in your household. This includes items like eating and drinking utensils, towels, and bedding.    If you need to cough or sneeze, do it into a tissue. Then throw the tissue into the trash. If you don't have tissues, cough or sneeze into the bend of your elbow.    Wash your hands often.    Self-care at home   At this time, there is no medicine approved to prevent or treat COVID-19. The FDA has approved an antiviral medicine (called remdesivir) for people being treated in the hospital. This is for people 12 years and older who weigh more than about 88 pounds (40 kgs). In certain cases, it may also be used for people younger than 12 years or who weigh less than about 88 pounds (40 kgs)..  Currently, treatment is mostly aimed at helping your body while it fights the virus.    Getting extra rest.    Drink extra fluids 6 to 8 glasses of liquids each day), unless a doctor has told you not to. Ask your care team which fluids are best for you. Avoid fluids that contain caffeine or alcohol.    Taking over-the-counter (OTC) medicine to reduce pain and fever. Your care team will tell you which medicine to use.  If you ve  been in the hospital for COVID-19, follow your care team s instructions. They will tell you when to stop self-isolation. They may also have you change positions to help your breathing (such as lying on your belly).  If a test showed that you have COVID-19, you may be asked to donate plasma after you ve recovered. (This is called COVID-19 convalescent plasma donation.) The plasma may contain antibodies to help fight the virus in others. Experts don't know if the plasma will work well as a treatment. Research continues, and the FDA has approved it for emergency use in certain people with serious or life-threatening COVID-19. For more information, talk to your care team.  Caring for a sick person     Follow all instructions from the care team.    Wash your hands often.    Wear protective clothing as advised.    Make sure the sick person wears a mask. If they can't wear a mask, don t stay in the same room with them. If you must be in the same room, wear a face mask. Make sure the mask covers both the nose and mouth.    Keep track of the sick person s symptoms.    Clean surfaces often with disinfectant. This includes phones, kitchen counters, fridge door handles, bathroom surfaces, and others.    Don t let anyone share household items with the sick person. This includes eating and drinking tools, towels, sheets, or blankets.    Clean fabrics and laundry well.    Keep other people and pets away from the sick person.    When you can stop self-isolation  When you are sick with COVID-19, you should stay away from other people. This is called self-isolation. The rules for ending self-isolation depend on your health in general.  If you are normally healthy:  You can stop self-isolation when all 3 of these are true:    You ve had no fever--and no medicine that reduces fever--for at least 24 hours. And     Your symptoms are better, such as cough or trouble breathing. And     At least 10 days have passed since your symptoms first  started.  Talk with your care team before you leave home. They may tell you it s okay to leave, or they may give you different advice. You do not need to be re-tested.  If you have a weak immune system, or you ve had severe COVID-19:  Follow your care team s instructions. You may be asked to self-isolate for 10 days to 20 days after your symptoms first started. Your care team may want to re-test you for COVID-19.  Note: If you re being treated for cancer, have an immune disorder (such as HIV), or have had a transplant (organ or bone marrow), you may have a weak immune system.  If you've already had COVID-19 once:  If you had the virus over 3 months ago, and you ve been exposed again, treat it like you've never had COVID-19. Stay home, limit your contact with others, call your care team, and watch for symptoms.  If it s been less than 3 months since you had the virus, you re symptom-free, and you ve been exposed again: You don t need to self-isolate or be re-tested. But if you develop new symptoms that can t be linked to another illness, please self-isolate and contact your care team.  When you return to public settings  When you are well enough to go outside your home, follow the CDC s guidance on cloth face masks.    Anyone over age 2 should wear a face mask in public, especially when it's hard to socially distance. This includes public transit, public protests and marches, and crowded stores, bars, and restaurants.    Face masks are most likely to reduce the spread of COVID-19 when they are widely used by people who are out in the public.  Certain people should not wear a face covering. These include:    Children under 2 years old    Anyone with a health, developmental, or mental health condition that can be made worse by wearing a mask    Anyone who is unconscious or unable to remove the face covering without help. See the CDC's guidance on who should not wear a face mask.  When to call your care team  Call your  care team right away if a sick person has any of these:    Trouble breathing    Pain or pressure in chest  If a sick person has any of these, call 911:    Trouble breathing that gets worse    Pain or pressure in chest that gets worse    Blue tint to lips or face    Fast or irregular heartbeat    Confusion or trouble waking    Fainting or loss of consciousness    Coughing up blood  Going home from the hospital   If you have COVID-19 and were recently in the hospital:    Follow the instructions above for self-care and isolation.    Follow the hospital care team s instructions.    Ask questions if anything is unclear to you. Write down answers so you remember them.  Date last modified: 11/23/2020  [employer] last reviewed this educational content on 4/1/2020  This information has been modified by your health care provider with permission from the publisher.    4954-9750 [employer]. [address] [place] [id number]. All rights reserved. This information is not intended as a substitute for professional medical care. Always follow your healthcare professional's instructions.           Patient Education     Viral Upper Respiratory Illness (Adult)    You have a viral upper respiratory illness (URI), which is another term for the common cold. This illness is contagious during the first few days. It is spread through the air by coughing and sneezing. It may also be spread by direct contact (touching the sick person and then touching your own eyes, nose, or mouth). Frequent handwashing will decrease risk of spread. Most viral illnesses go away within 7 to 10 days with rest and simple home remedies. Sometimes the illness may last for several weeks. Antibiotics will not kill a virus, and they are generally not prescribed for this condition.  Home care    If symptoms are severe, rest at home for the first 2 to 3 days. When you resume activity, don't let yourself get too tired.    Don't smoke. If you need  help stopping, talk with your healthcare provider.    Avoid being exposed to cigarette smoke (yours or others ).    You may use acetaminophen or ibuprofen to control pain and fever, unless another medicine was prescribed. If you have chronic liver or kidney disease, have ever had a stomach ulcer or gastrointestinal bleeding, or are taking blood-thinning medicines, talk with your healthcare provider before using these medicines. Aspirin should never be given to anyone under 18 years of age who is ill with a viral infection or fever. It may cause severe liver or brain damage.    Your appetite may be poor, so a light diet is fine. Stay well hydrated by drinking 6 to 8 glasses of fluids per day (water, soft drinks, juices, tea, or soup). Extra fluids will help loosen secretions in the nose and lungs.    Over-the-counter cold medicines will not shorten the length of time you re sick, but they may be helpful for the following symptoms: cough, sore throat, and nasal and sinus congestion. If you take prescription medicines, ask your healthcare provider or pharmacist which over-the-counter medicines are safe to use. (Note: Don't use decongestants if you have high blood pressure.)  Follow-up care  Follow up with your healthcare provider, or as advised.  When to seek medical advice  Call your healthcare provider right away if any of these occur:    Cough with lots of colored sputum (mucus)    Severe headache; face, neck, or ear pain    Difficulty swallowing due to throat pain    Fever of 100.4 F (38 C) or higher, or as directed by your healthcare provider  Call 911  Call 911 if any of these occur:    Chest pain, shortness of breath, wheezing, or difficulty breathing    Coughing up blood    Very severe pain with swallowing, especially if it goes along with a muffled voice   [employer] last reviewed this educational content on 6/1/2018 2000-2021 [employer]. All rights reserved. This information is not intended as  a substitute for professional medical care. Always follow your healthcare professional's instructions.

## 2021-11-04 NOTE — PROGRESS NOTES
SUBJECTIVE:   Karen Soliz is a 26 year old female presenting with a chief complaint of fever and sore throat.  Onset of symptoms was 2 day(s) ago.  Course of illness is same.    Severity mild  Current and Associated symptoms: as above  Treatment measures tried include Fluids and Rest.  Predisposing factors include None.    No past medical history on file.  Current Outpatient Medications   Medication Sig Dispense Refill     clarithromycin (BIAXIN) 500 MG tablet Take 1 tablet (500 mg) by mouth 2 times daily 28 tablet 0     omeprazole (PRILOSEC) 20 MG DR capsule Take one capsule by mouth twice daily for at least two weeks while on antibiotics, then may reduce to once daily. 60 capsule 1     Social History     Tobacco Use     Smoking status: Never Smoker     Smokeless tobacco: Never Used   Substance Use Topics     Alcohol use: No       ROS:  10 point ROS negative except as listed above      OBJECTIVE:  /74   Pulse 95   Temp 99.4  F (37.4  C)   Resp 18   Wt 68 kg (150 lb)   SpO2 98%   BMI 27.00 kg/m    GENERAL APPEARANCE: healthy, alert and no distress  EYES: conjunctiva clear  HENT: ear canals and TM's normal.  Nose and mouth without ulcers, erythema or lesions  NECK: supple, nontender, no lymphadenopathy  RESP: lungs clear to auscultation - no rales, rhonchi or wheezes  CV: regular rates and rhythm, normal S1 S2, no murmur noted  ABDOMEN:  soft, nontender, no HSM or masses and bowel sounds normal  NEURO: Normal strength and tone, sensory exam grossly normal,  normal speech and mentation  SKIN: no suspicious lesions or rashes      Results for orders placed or performed in visit on 10/27/21   Symptomatic COVID-19 Virus (Coronavirus) by PCR Nose     Status: Normal    Specimen: Nose; Swab   Result Value Ref Range    SARS CoV2 PCR Negative Negative    Narrative    Testing was performed using the Aptima SARS-CoV-2 Assay on the  [email or web address] Instrument System. Additional information about this  Emergency Use  Authorization (EUA) assay can be found via the Lab  Guide. This test should be ordered for the detection of SARS-CoV-2 in  individuals who meet SARS-CoV-2 clinical and/or epidemiological  criteria. Test performance is unknown in asymptomatic patients. This  test is for in vitro diagnostic use under the FDA EUA for  laboratories certified under CLIA to perform high complexity testing.  This test has not been FDA cleared or approved. A negative result  does not rule out the presence of PCR inhibitors in the specimen or  target RNA in concentration below the limit of detection for the  assay. The possibility of a false negative should be considered if  the patient's recent exposure or clinical presentation suggests  COVID-19. This test was validated by the [facility]. This laboratory is certified under  the Clinical Laboratory Improvement Amendments of 1988 (CLIA-88) as  qualified to perform high complexity laboratory testing.   Streptococcus A Rapid Screen w/Reflex to PCR - Clinic Collect     Status: Normal    Specimen: Throat; Swab   Result Value Ref Range    Group A Strep antigen Negative Negative   Group A Streptococcus PCR Throat Swab     Status: Normal    Specimen: Throat; Swab   Result Value Ref Range    Group A strep by PCR Not Detected Not Detected    Narrative    The Xpert Xpress Strep A test, performed on the [employer], is a rapid, qualitative in vitro diagnostic test for the detection of Streptococcus pyogenes (Group A ß-hemolytic Streptococcus, Strep A) in throat swab specimens from patients with signs and symptoms of pharyngitis. The Xpert Xpress Strep A test can be used as an aid in the diagnosis of Group A Streptococcal pharyngitis. The assay is not intended to monitor treatment for Group A Streptococcus infections. The Xpert Xpress Strep A test utilizes an automated real-time polymerase chain reaction (PCR) to detect Streptococcus  pyogenes DNA.       ASSESSMENT:  (Z20.822) Suspected COVID-19 virus infection  (primary encounter diagnosis)  Plan: Symptomatic COVID-19 Virus (Coronavirus) by PCR        Nose      (R07.0) Throat pain  Plan: Streptococcus A Rapid Screen w/Reflex to PCR -         Clinic Collect, Group A Streptococcus PCR         Throat Swab      Covid-19  Pt was evaluated during a global COVID-19 pandemic, which necessitated consideration that the patient might be at risk for infection with the SARS-CoV-2 virus that causes COVID-19.   Applicable protocols for evaluation were followed during the patient's care.   COVID-19 was considered as part of the patient's evaluation. The plan for testing is:  a test was ordered during this visit.    No severe headache, chest pain, shortness of breath  No additional infectious symptoms  Rest, isolate for 10 days, hydrate, test, follow up if worsening or new symptoms  HH members to isolate until test results, if positive isolate for 2 weeks and follow up for testing if symptoms occur  Red flags and emergent follow up discussed, and understood by patient  Follow up with PCP if symptoms worsen or fail to improve    Patient Instructions     Follow up immediately with severe headache, chest pain, or shortness of breath    Rest, isolate for 10 days, hydrate, follow up if worsening or new symptoms  Household members to isolate until test results, if positive isolate for 2 weeks and follow up for testing if symptoms occur         Patient Education     Coronavirus Disease 2019 (COVID-19): Caring for Yourself or Others   If you or a household member have symptoms of COVID-19, follow the guidelines below. This will help you manage symptoms and keep the virus from spreading.  If you have symptoms of COVID-19    Stay home and contact your care team. They will tell you what to do.    Don t panic. Keep in mind that other illnesses can cause similar symptoms.    Stay away from work, school, and public  places.    Limit physical contact with others in your home. Limit visitors. No kissing.  Clean surfaces you touch with disinfectant.  If you need to cough or sneeze, do it into a tissue. Then throw the tissue into the trash. If you don't have tissues, cough or sneeze into the bend of your elbow.  Don t share food or personal items with people in your household. This includes items like eating and drinking utensils, towels, and bedding.  Wear a cloth face mask around other people. During a public health emergency, medical face masks may be reserved for healthcare workers. You may need to make a cloth face mask of your own. You can do this using a bandana, T-shirt, or other cloth. The [facility][note 3] has instructions on how to make a face mask. Wear the mask so that it covers both your nose and mouth.  If you need to go to a hospital or clinic, call ahead to let them know. Expect the care team to wear masks, gowns, gloves, and eye protection. You may need to come to a different entrance or wait in a separate room.  Follow all instructions from your care team.    If you ve been diagnosed with COVID-19    Stay home and away from others, including other people in your home. (This is called self-isolation.) Don t leave home unless you need to get medical care. Don t go to work, school, or public places. Don t use buses, taxis, or other public transportation.    Follow all instructions from your care team.    If you need to go to a hospital or clinic, call ahead to let them know. Expect the care team to wear masks, gowns, gloves, and eye protection. You may need to come to a different entrance or wait in a separate room.    Wear a face mask over your nose and mouth. This is to protect others from your germs. If you can t wear a mask, your caregivers should wear one. You may need to make your own mask using a bandana, T-shirt, or other cloth. See the CDC s instructions on how to make a face mask.    Have no contact with pets and other  animals.    Don t share food or personal items with people in your household. This includes items like eating and drinking utensils, towels, and bedding.    If you need to cough or sneeze, do it into a tissue. Then throw the tissue into the trash. If you don't have tissues, cough or sneeze into the bend of your elbow.    Wash your hands often.    Self-care at home   At this time, there is no medicine approved to prevent or treat COVID-19. The FDA has approved an antiviral medicine (called remdesivir) for people being treated in the hospital. This is for people 12 years and older who weigh more than about 88 pounds (40 kgs). In certain cases, it may also be used for people younger than 12 years or who weigh less than about 88 pounds (40 kgs)..  Currently, treatment is mostly aimed at helping your body while it fights the virus.    Getting extra rest.    Drink extra fluids 6 to 8 glasses of liquids each day), unless a doctor has told you not to. Ask your care team which fluids are best for you. Avoid fluids that contain caffeine or alcohol.    Taking over-the-counter (OTC) medicine to reduce pain and fever. Your care team will tell you which medicine to use.  If you ve been in the hospital for COVID-19, follow your care team s instructions. They will tell you when to stop self-isolation. They may also have you change positions to help your breathing (such as lying on your belly).  If a test showed that you have COVID-19, you may be asked to donate plasma after you ve recovered. (This is called COVID-19 convalescent plasma donation.) The plasma may contain antibodies to help fight the virus in others. Experts don't know if the plasma will work well as a treatment. Research continues, and the FDA has approved it for emergency use in certain people with serious or life-threatening COVID-19. For more information, talk to your care team.  Caring for a sick person     Follow all instructions from the care team.    Wash your  hands often.    Wear protective clothing as advised.    Make sure the sick person wears a mask. If they can't wear a mask, don t stay in the same room with them. If you must be in the same room, wear a face mask. Make sure the mask covers both the nose and mouth.    Keep track of the sick person s symptoms.    Clean surfaces often with disinfectant. This includes phones, kitchen counters, fridge door handles, bathroom surfaces, and others.    Don t let anyone share household items with the sick person. This includes eating and drinking tools, towels, sheets, or blankets.    Clean fabrics and laundry well.    Keep other people and pets away from the sick person.    When you can stop self-isolation  When you are sick with COVID-19, you should stay away from other people. This is called self-isolation. The rules for ending self-isolation depend on your health in general.  If you are normally healthy:  You can stop self-isolation when all 3 of these are true:    You ve had no fever--and no medicine that reduces fever--for at least 24 hours. And     Your symptoms are better, such as cough or trouble breathing. And     At least 10 days have passed since your symptoms first started.  Talk with your care team before you leave home. They may tell you it s okay to leave, or they may give you different advice. You do not need to be re-tested.  If you have a weak immune system, or you ve had severe COVID-19:  Follow your care team s instructions. You may be asked to self-isolate for 10 days to 20 days after your symptoms first started. Your care team may want to re-test you for COVID-19.  Note: If you re being treated for cancer, have an immune disorder (such as HIV), or have had a transplant (organ or bone marrow), you may have a weak immune system.  If you've already had COVID-19 once:  If you had the virus over 3 months ago, and you ve been exposed again, treat it like you've never had COVID-19. Stay home, limit your contact  with others, call your care team, and watch for symptoms.  If it s been less than 3 months since you had the virus, you re symptom-free, and you ve been exposed again: You don t need to self-isolate or be re-tested. But if you develop new symptoms that can t be linked to another illness, please self-isolate and contact your care team.  When you return to public settings  When you are well enough to go outside your home, follow the CDC s guidance on cloth face masks.    Anyone over age 2 should wear a face mask in public, especially when it's hard to socially distance. This includes public transit, public protests and marches, and crowded stores, bars, and restaurants.    Face masks are most likely to reduce the spread of COVID-19 when they are widely used by people who are out in the public.  Certain people should not wear a face covering. These include:    Children under 2 years old    Anyone with a health, developmental, or mental health condition that can be made worse by wearing a mask    Anyone who is unconscious or unable to remove the face covering without help. See the CDC's guidance on who should not wear a face mask.  When to call your care team  Call your care team right away if a sick person has any of these:    Trouble breathing    Pain or pressure in chest  If a sick person has any of these, call 911:    Trouble breathing that gets worse    Pain or pressure in chest that gets worse    Blue tint to lips or face    Fast or irregular heartbeat    Confusion or trouble waking    Fainting or loss of consciousness    Coughing up blood  Going home from the hospital   If you have COVID-19 and were recently in the hospital:    Follow the instructions above for self-care and isolation.    Follow the hospital care team s instructions.    Ask questions if anything is unclear to you. Write down answers so you remember them.  Date last modified: 11/23/2020  Adri last reviewed this educational content on  4/1/2020  This information has been modified by your health care provider with permission from the publisher.    6620-1083 [employer]. [address] [place] [address], [place] [address]. All rights reserved. This information is not intended as a substitute for professional medical care. Always follow your healthcare professional's instructions.           Patient Education     Viral Upper Respiratory Illness (Adult)    You have a viral upper respiratory illness (URI), which is another term for the common cold. This illness is contagious during the first few days. It is spread through the air by coughing and sneezing. It may also be spread by direct contact (touching the sick person and then touching your own eyes, nose, or mouth). Frequent handwashing will decrease risk of spread. Most viral illnesses go away within 7 to 10 days with rest and simple home remedies. Sometimes the illness may last for several weeks. Antibiotics will not kill a virus, and they are generally not prescribed for this condition.  Home care    If symptoms are severe, rest at home for the first 2 to 3 days. When you resume activity, don't let yourself get too tired.    Don't smoke. If you need help stopping, talk with your healthcare provider.    Avoid being exposed to cigarette smoke (yours or others ).    You may use acetaminophen or ibuprofen to control pain and fever, unless another medicine was prescribed. If you have chronic liver or kidney disease, have ever had a stomach ulcer or gastrointestinal bleeding, or are taking blood-thinning medicines, talk with your healthcare provider before using these medicines. Aspirin should never be given to anyone under 18 years of age who is ill with a viral infection or fever. It may cause severe liver or brain damage.    Your appetite may be poor, so a light diet is fine. Stay well hydrated by drinking 6 to 8 glasses of fluids per day (water, soft drinks, juices, tea, or soup). Extra fluids  will help loosen secretions in the nose and lungs.    Over-the-counter cold medicines will not shorten the length of time you re sick, but they may be helpful for the following symptoms: cough, sore throat, and nasal and sinus congestion. If you take prescription medicines, ask your healthcare provider or pharmacist which over-the-counter medicines are safe to use. (Note: Don't use decongestants if you have high blood pressure.)  Follow-up care  Follow up with your healthcare provider, or as advised.  When to seek medical advice  Call your healthcare provider right away if any of these occur:    Cough with lots of colored sputum (mucus)    Severe headache; face, neck, or ear pain    Difficulty swallowing due to throat pain    Fever of 100.4 F (38 C) or higher, or as directed by your healthcare provider  Call 911  Call 911 if any of these occur:    Chest pain, shortness of breath, wheezing, or difficulty breathing    Coughing up blood    Very severe pain with swallowing, especially if it goes along with a muffled voice   Adri last reviewed this educational content on 6/1/2018 2000-2021 [employer]. All rights reserved. This information is not intended as a substitute for professional medical care. Always follow your healthcare professional's instructions.

## 2021-12-02 ENCOUNTER — OFFICE VISIT (OUTPATIENT)
Dept: URGENT CARE | Facility: URGENT CARE | Age: 26
End: 2021-12-02
Payer: COMMERCIAL

## 2021-12-02 VITALS
BODY MASS INDEX: 26.46 KG/M2 | TEMPERATURE: 99.1 F | OXYGEN SATURATION: 98 % | DIASTOLIC BLOOD PRESSURE: 75 MMHG | WEIGHT: 147 LBS | SYSTOLIC BLOOD PRESSURE: 112 MMHG | RESPIRATION RATE: 15 BRPM

## 2021-12-02 DIAGNOSIS — J02.9 VIRAL PHARYNGITIS: Primary | ICD-10-CM

## 2021-12-02 DIAGNOSIS — J02.9 SORE THROAT: ICD-10-CM

## 2021-12-02 LAB
DEPRECATED S PYO AG THROAT QL EIA: NEGATIVE
FLUAV AG SPEC QL IA: NEGATIVE
FLUBV AG SPEC QL IA: NEGATIVE

## 2021-12-02 PROCEDURE — 99213 OFFICE O/P EST LOW 20 MIN: CPT | Performed by: NURSE PRACTITIONER

## 2021-12-02 PROCEDURE — 87804 INFLUENZA ASSAY W/OPTIC: CPT | Performed by: NURSE PRACTITIONER

## 2021-12-02 PROCEDURE — 87651 STREP A DNA AMP PROBE: CPT | Performed by: NURSE PRACTITIONER

## 2021-12-03 LAB — GROUP A STREP BY PCR: NOT DETECTED

## 2021-12-03 NOTE — PATIENT INSTRUCTIONS
Salt water gargles.  Tylenol or ibuprofen as needed for pain.  Try to eat soft foods that are not spicy or acidic.

## 2021-12-03 NOTE — PROGRESS NOTES
Chief Complaint   Patient presents with     Urgent Care     sore throat since last night.         ICD-10-CM    1. Viral pharyngitis  J02.9    2. Sore throat  J02.9 Influenza A & B Antigen     Streptococcus A Rapid Screen w/Reflex to PCR     Group A Streptococcus PCR Throat Swab     Encouraged use of Tylenol or ibuprofen as needed for pain.  Salt water gargles.  Soft food, warm water with honey or tea.  Popsicles.  Follow-up with primary care provider if you develop new symptoms.      Results for orders placed or performed in visit on 12/02/21 (from the past 24 hour(s))   Influenza A & B Antigen    Specimen: Nose; Swab   Result Value Ref Range    Influenza A antigen Negative Negative    Influenza B antigen Negative Negative    Narrative    Test results must be correlated with clinical data. If necessary, results should be confirmed by a molecular assay or viral culture.   Streptococcus A Rapid Screen w/Reflex to PCR    Specimen: Throat; Swab   Result Value Ref Range    Group A Strep antigen Negative Negative       Subjective     Karen Soliz is an 26 year old female who presents to clinic today for ST that started last night.      ROS: 10 point ROS neg other than the symptoms noted above in the HPI.       Objective    /75 (BP Location: Left arm, Patient Position: Sitting, Cuff Size: Adult Regular)   Temp 99.1  F (37.3  C) (Tympanic)   Resp 15   Wt 66.7 kg (147 lb)   SpO2 98%   BMI 26.46 kg/m    Nurses notes and VS have been reviewed.    Physical Exam       GENERAL APPEARANCE: healthy appearing, alert     EYES: PERRL, EOMI, sclera non-icteric     HENT: oral exam benign, mucus membranes intact, without ulcers or lesions     NECK: no adenopathy or asymmetry, thyroid normal to palpation     RESP: lungs clear to auscultation - no rales, rhonchi or wheezes     CV: regular rates and rhythm, no murmurs, rubs, or gallop     SKIN: no suspicious lesions or rashes    Patient Instructions   Salt water gargles.   Tylenol or ibuprofen as needed for pain.  Try to eat soft foods that are not spicy or acidic.            CLAIRE Ayala, CNP  [facility] Provider    The use of Dragon/[employer]age dictation services may have been used to construct the content in this note; any grammatical or spelling errors are non-intentional. Please contact the author of this note directly if you are in need of any clarification.

## 2021-12-14 ENCOUNTER — IMMUNIZATION (OUTPATIENT)
Dept: NURSING | Facility: CLINIC | Age: 26
End: 2021-12-14
Payer: COMMERCIAL

## 2021-12-14 PROCEDURE — 0001A PR COVID VAC PFIZER DIL RECON 30 MCG/0.3 ML IM: CPT

## 2021-12-14 PROCEDURE — 90686 IIV4 VACC NO PRSV 0.5 ML IM: CPT

## 2021-12-14 PROCEDURE — 91300 PR COVID VAC PFIZER DIL RECON 30 MCG/0.3 ML IM: CPT

## 2021-12-14 PROCEDURE — 90471 IMMUNIZATION ADMIN: CPT

## 2022-01-28 ENCOUNTER — IMMUNIZATION (OUTPATIENT)
Dept: NURSING | Facility: CLINIC | Age: 27
End: 2022-01-28
Attending: FAMILY MEDICINE
Payer: COMMERCIAL

## 2022-01-28 PROCEDURE — 91305 COVID-19,PF,PFIZER (12+ YRS): CPT

## 2022-01-28 PROCEDURE — 0052A COVID-19,PF,PFIZER (12+ YRS): CPT

## 2022-05-17 ENCOUNTER — VIRTUAL VISIT (OUTPATIENT)
Dept: INTERNAL MEDICINE | Facility: CLINIC | Age: 27
End: 2022-05-17
Payer: COMMERCIAL

## 2022-05-17 DIAGNOSIS — E55.9 VITAMIN D DEFICIENCY: ICD-10-CM

## 2022-05-17 DIAGNOSIS — E56.9 VITAMIN DEFICIENCY: ICD-10-CM

## 2022-05-17 DIAGNOSIS — L98.9 SKIN ABNORMALITIES: Primary | ICD-10-CM

## 2022-05-17 PROCEDURE — 99214 OFFICE O/P EST MOD 30 MIN: CPT | Mod: 95 | Performed by: NURSE PRACTITIONER

## 2022-05-17 NOTE — NURSING NOTE
"Chief Complaint   Patient presents with     Referral     Pt wanting to be tested for Food allergies     initial LMP 04/22/2022  Estimated body mass index is 26.46 kg/m  as calculated from the following:    Height as of 8/12/21: 1.588 m (5' 2.5\").    Weight as of 12/2/21: 66.7 kg (147 lb)..  bp completed using cuff size NA (Not Taken)  BHAKTI URRUTIA LPN  "

## 2022-05-17 NOTE — PROGRESS NOTES
"Karen is a 27 year old who is being evaluated via a billable video visit.      How would you like to obtain your AVS? Mail a copy  If the video visit is dropped, the invitation should be resent by: Text to cell phone: [phone]  Will anyone else be joining your video visit? No    Video Start Time: 1530    Assessment & Plan     Skin abnormalities    - Allergy adult food panel; Future  - CBC with platelets and differential; Future  - Comprehensive metabolic panel (BMP + Alb, Alk Phos, ALT, AST, Total. Bili, TP); Future  - Vitamin B12; Future  - Vitamin B6; Future  - Vitamin B1 whole blood; Future  - TSH with free T4 reflex; Future    Vitamin D deficiency    - Vitamin D Deficiency; Future    Vitamin deficiency    - Vitamin D Deficiency; Future  - Vitamin B12; Future  - Vitamin B6; Future  - Vitamin B1 whole blood; Future      34 minutes spent on the date of the encounter doing chart review, history and exam, documentation and further activities per the note       BMI:   Estimated body mass index is 26.46 kg/m  as calculated from the following:    Height as of 8/12/21: 1.588 m (5' 2.5\").    Weight as of 12/2/21: 66.7 kg (147 lb).       Patient Instructions   Lab when you can       Return in about 1 year (around 5/17/2023).    CLAIRE Parson CNP  [facility]    Hellen Jones is a 27 year old who presents for the following health issues     HPI   Chief Complaint   Patient presents with     Referral     Pt wanting to be tested for Food allergies     Can eat   Works out 5 days a week   Cardio and lift weights     Has brown spots on face   Saw dermatology  Medication caused bleaching of her skin   Only take 9 months at a time     2018 moved to [place]    Take vit D 4000 units a day        Review of Systems   Constitutional, HEENT, cardiovascular, pulmonary, GI, , musculoskeletal, neuro, skin, endocrine and psych systems are negative, except as otherwise noted.      Objective     "       Vitals:  No vitals were obtained today due to virtual visit.    Physical Exam   GENERAL: alert and no distress  EYES: Eyes grossly normal to inspection.  No discharge or erythema, or obvious scleral/conjunctival abnormalities.  RESP: No audible wheeze, cough, or visible cyanosis.  No visible retractions or increased work of breathing.    SKIN: Visible skin clear. darker skin lesion on face with acne   NEURO: Cranial nerves grossly intact.  Mentation and speech appropriate for age.  PSYCH: Mentation appears normal, affect normal/bright, judgement and insight intact, normal speech and appearance well-groomed.    LAB          Video-Visit Details    Type of service:  Video Visit    Video End Time:4:03 PM    Originating Location (pt. Location): Home    Distant Location (provider location):  [facility]     Platform used for Video Visit: [employer]

## 2022-05-19 ENCOUNTER — LAB (OUTPATIENT)
Dept: LAB | Facility: CLINIC | Age: 27
End: 2022-05-19
Payer: COMMERCIAL

## 2022-05-19 DIAGNOSIS — E55.9 VITAMIN D DEFICIENCY: ICD-10-CM

## 2022-05-19 DIAGNOSIS — L98.9 SKIN ABNORMALITIES: ICD-10-CM

## 2022-05-19 DIAGNOSIS — E56.9 VITAMIN DEFICIENCY: ICD-10-CM

## 2022-05-19 LAB
BASOPHILS # BLD AUTO: 0 10E3/UL (ref 0–0.2)
BASOPHILS NFR BLD AUTO: 1 %
EOSINOPHIL # BLD AUTO: 0.3 10E3/UL (ref 0–0.7)
EOSINOPHIL NFR BLD AUTO: 4 %
ERYTHROCYTE [DISTWIDTH] IN BLOOD BY AUTOMATED COUNT: 11.8 % (ref 10–15)
HCT VFR BLD AUTO: 38.3 % (ref 35–47)
HGB BLD-MCNC: 13.6 G/DL (ref 11.7–15.7)
IMM GRANULOCYTES # BLD: 0 10E3/UL
IMM GRANULOCYTES NFR BLD: 0 %
LYMPHOCYTES # BLD AUTO: 1.9 10E3/UL (ref 0.8–5.3)
LYMPHOCYTES NFR BLD AUTO: 24 %
MCH RBC QN AUTO: 33.9 PG (ref 26.5–33)
MCHC RBC AUTO-ENTMCNC: 35.5 G/DL (ref 31.5–36.5)
MCV RBC AUTO: 96 FL (ref 78–100)
MONOCYTES # BLD AUTO: 0.5 10E3/UL (ref 0–1.3)
MONOCYTES NFR BLD AUTO: 7 %
NEUTROPHILS # BLD AUTO: 5.3 10E3/UL (ref 1.6–8.3)
NEUTROPHILS NFR BLD AUTO: 65 %
PLATELET # BLD AUTO: 195 10E3/UL (ref 150–450)
RBC # BLD AUTO: 4.01 10E6/UL (ref 3.8–5.2)
VIT B12 SERPL-MCNC: 805 PG/ML (ref 193–986)
WBC # BLD AUTO: 8.1 10E3/UL (ref 4–11)

## 2022-05-19 PROCEDURE — 84425 ASSAY OF VITAMIN B-1: CPT | Mod: 90

## 2022-05-19 PROCEDURE — 82607 VITAMIN B-12: CPT

## 2022-05-19 PROCEDURE — 36415 COLL VENOUS BLD VENIPUNCTURE: CPT

## 2022-05-19 PROCEDURE — 82306 VITAMIN D 25 HYDROXY: CPT

## 2022-05-19 PROCEDURE — 80050 GENERAL HEALTH PANEL: CPT

## 2022-05-19 PROCEDURE — 86003 ALLG SPEC IGE CRUDE XTRC EA: CPT | Mod: 59

## 2022-05-19 PROCEDURE — 99000 SPECIMEN HANDLING OFFICE-LAB: CPT

## 2022-05-19 PROCEDURE — 84207 ASSAY OF VITAMIN B-6: CPT | Mod: 90

## 2022-05-20 LAB
ALBUMIN SERPL-MCNC: 3.9 G/DL (ref 3.4–5)
ALP SERPL-CCNC: 84 U/L (ref 40–150)
ALT SERPL W P-5'-P-CCNC: 32 U/L (ref 0–50)
ANION GAP SERPL CALCULATED.3IONS-SCNC: 5 MMOL/L (ref 3–14)
AST SERPL W P-5'-P-CCNC: 28 U/L (ref 0–45)
BILIRUB SERPL-MCNC: 0.3 MG/DL (ref 0.2–1.3)
BUN SERPL-MCNC: 15 MG/DL (ref 7–30)
CALCIUM SERPL-MCNC: 9.3 MG/DL (ref 8.5–10.1)
CHLORIDE BLD-SCNC: 108 MMOL/L (ref 94–109)
CO2 SERPL-SCNC: 26 MMOL/L (ref 20–32)
CREAT SERPL-MCNC: 0.53 MG/DL (ref 0.52–1.04)
DEPRECATED CALCIDIOL+CALCIFEROL SERPL-MC: 63 UG/L (ref 20–75)
GFR SERPL CREATININE-BSD FRML MDRD: >90 ML/MIN/1.73M2
GLUCOSE BLD-MCNC: 104 MG/DL (ref 70–99)
POTASSIUM BLD-SCNC: 3.7 MMOL/L (ref 3.4–5.3)
PROT SERPL-MCNC: 7.8 G/DL (ref 6.8–8.8)
SODIUM SERPL-SCNC: 139 MMOL/L (ref 133–144)
TSH SERPL DL<=0.005 MIU/L-ACNC: 0.65 MU/L (ref 0.4–4)

## 2022-05-23 ENCOUNTER — TELEPHONE (OUTPATIENT)
Dept: INTERNAL MEDICINE | Facility: CLINIC | Age: 27
End: 2022-05-23
Payer: COMMERCIAL

## 2022-05-23 DIAGNOSIS — E67.8 HYPERVITAMINOSIS, B COMPLEX: Primary | ICD-10-CM

## 2022-05-23 LAB
ALMOND IGE QN: <0.1 KU(A)/L
CASHEW NUT IGE QN: <0.1 KU(A)/L
CODFISH IGE QN: <0.1 KU(A)/L
COW MILK IGE QN: <0.1 KU(A)/L
EGG WHITE IGE QN: <0.1 KU(A)/L
HAZELNUT IGE QN: <0.1 KU(A)/L
IGE SERPL-ACNC: 16 KU/L (ref 0–114)
PEANUT IGE QN: <0.1 KU(A)/L
PYRIDOXAL PHOS SERPL-SCNC: 345.5 NMOL/L
SALMON IGE QN: <0.1 KU(A)/L
SCALLOP IGE QN: <0.1 KU(A)/L
SESAME SEED IGE QN: <0.1 KU(A)/L
SHRIMP IGE QN: <0.1 KU(A)/L
SOYBEAN IGE QN: <0.1 KU(A)/L
TUNA IGE QN: <0.1 KU(A)/L
WALNUT IGE QN: <0.1 KU(A)/L
WHEAT IGE QN: <0.1 KU(A)/L

## 2022-05-23 NOTE — TELEPHONE ENCOUNTER
Patient calls for lab results done on 5/19.     Advised there are 2 still in process. Once all labs return, someone will give her a call to review. Patient stated an understanding and agreed with plan.     Routing to provider to review and advise.     Angie Peoples RN  [facility]

## 2022-05-24 LAB — VIT B1 PYROPHOSHATE BLD-SCNC: 117 NMOL/L

## 2022-05-24 NOTE — TELEPHONE ENCOUNTER
Patient called again, she is most interested in results of the allergy testing and wants to know if any treatment or referral to specialist is indicated. She knows there is still 1 lab test pending but she wants a response before 3 p.m. today.   KATERINA Orta R.N.

## 2022-05-24 NOTE — TELEPHONE ENCOUNTER
Patient advised of recommendations. She states she does not take any supplements with B6 in them. Her level was elevated a year ago as well, she asks if there is anything she can do to lower this or if this could be having any effect on her skin.     Ana Maria Martinez RN  [facility]

## 2022-05-24 NOTE — TELEPHONE ENCOUNTER
She takes no multivitamin? Vázquez she drink energy drinks?   Body will normally pee off excess B 6   Nothing I know will decrease it, except time

## 2022-05-24 NOTE — TELEPHONE ENCOUNTER
Lab acceptable except    B 6 very high  If taking supplement,  she should stop     Food panel negative for allergies

## 2022-05-25 NOTE — TELEPHONE ENCOUNTER
She needs to look at her specific products, but usually it is energy drinks, or the like that are the most likely to cause it. One or more than one of her products she uses is giving her more B vitamins than she needs. I cannot tell her which to stop as I am not looking at labels.  But something she is taking is raising her b vitamins to a level she does not need  She can look at them and stop whatever seems to be the culprit

## 2022-05-25 NOTE — TELEPHONE ENCOUNTER
Call placed to patient. She wants to know why her vitamin B6 is elevated. Patient is taking a daily multivitamin and takes pre-work out. 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 Takes a creatinine supplement and pre-work out supplement. Patient states she needs all of this to maintain her schedule. Patient wants to know specifically if which supplements to stop and if there are any additional labs needed to discover why her vitamin B6 is elevated.    Patient requesting a call back today.

## 2022-05-25 NOTE — TELEPHONE ENCOUNTER
Called patient and had a long discussion regarding her supplements and what might be causing her Vitamin B levels to rise. Patient is going to stop the 2 supplements and would like to have her B levels rechecked in a month.      Please advise if this plan is ok?  Thanks    Labs pended.  Sign if appropriate.

## 2022-09-01 ENCOUNTER — LAB REQUISITION (OUTPATIENT)
Dept: LAB | Facility: CLINIC | Age: 27
End: 2022-09-01
Payer: COMMERCIAL

## 2022-09-01 DIAGNOSIS — E28.2 POLYCYSTIC OVARIAN SYNDROME: ICD-10-CM

## 2022-09-01 PROCEDURE — 83001 ASSAY OF GONADOTROPIN (FSH): CPT | Mod: ORL | Performed by: DERMATOLOGY

## 2022-09-01 PROCEDURE — 82627 DEHYDROEPIANDROSTERONE: CPT | Mod: ORL | Performed by: DERMATOLOGY

## 2022-09-01 PROCEDURE — 84270 ASSAY OF SEX HORMONE GLOBUL: CPT | Mod: ORL | Performed by: DERMATOLOGY

## 2022-09-01 PROCEDURE — 84403 ASSAY OF TOTAL TESTOSTERONE: CPT | Mod: ORL | Performed by: DERMATOLOGY

## 2022-09-01 PROCEDURE — 36415 COLL VENOUS BLD VENIPUNCTURE: CPT | Mod: ORL | Performed by: DERMATOLOGY

## 2022-09-01 PROCEDURE — 83002 ASSAY OF GONADOTROPIN (LH): CPT | Mod: ORL | Performed by: DERMATOLOGY

## 2022-09-02 LAB
DHEA-S SERPL-MCNC: 71 UG/DL (ref 35–430)
FSH SERPL IRP2-ACNC: 5.6 MIU/ML
LH SERPL-ACNC: 10.2 MIU/ML
SHBG SERPL-SCNC: 112 NMOL/L (ref 30–135)

## 2022-09-07 LAB
TESTOST FREE SERPL-MCNC: 0.19 NG/DL
TESTOST SERPL-MCNC: 25 NG/DL (ref 8–60)

## 2023-01-30 ENCOUNTER — OFFICE VISIT (OUTPATIENT)
Dept: INTERNAL MEDICINE | Facility: CLINIC | Age: 28
End: 2023-01-30
Payer: COMMERCIAL

## 2023-01-30 VITALS
BODY MASS INDEX: 25.38 KG/M2 | TEMPERATURE: 98.6 F | WEIGHT: 141 LBS | HEART RATE: 86 BPM | OXYGEN SATURATION: 99 % | SYSTOLIC BLOOD PRESSURE: 106 MMHG | DIASTOLIC BLOOD PRESSURE: 68 MMHG

## 2023-01-30 DIAGNOSIS — N64.4 BREAST PAIN, LEFT: Primary | ICD-10-CM

## 2023-01-30 PROCEDURE — 99213 OFFICE O/P EST LOW 20 MIN: CPT | Performed by: INTERNAL MEDICINE

## 2023-01-30 NOTE — LETTER
01/30/23      Karen Soliz  1995  [address]  [facility] [address]        To whom it may concern,    Please excuse Ms. Soliz from work this afternoon - she saw me in office for a medical concern. She will also need 2 hours off in the next 2-3 days to obtain an imaging study for further evaluation of this concern. She may return to work otherwise without restrictions.    Please contact me with any question or concerns.        Mellissa Chinchilla MD   [place] [address] [place] [id number]  T: [phone], F: [phone]

## 2023-01-30 NOTE — PROGRESS NOTES
"  ASSESSMENT/PLAN                                                      (N64.4) Breast pain, left  (primary encounter diagnosis)  Comment: normal exam.  Plan: breast ultrasound ordered to evaluate for pathology - patient to schedule; patient encouraged to wear supportive bra wear at all times (can wear soft bra at night).    Mellissa Chinchilla MD   [facility]  [address] [place] [address]  T: [phone], F: [phone]    SUBJECTIVE                                                      Karen Soliz is a very pleasant 28 year old female who presents with left breast pain:    Ongoing for the last several months. Comes and goes. When present, it last a couple days.  Patient describes the pain as \"pain.\"  Mild in severity. Worse when not wearing a bra.    No breast lumps or bumps.  No breast skin changes.  No nipple discharge.  Reports occasional chills, but no fevers or sweats.    No recent breast-feeding.  No history of breast surgeries or procedures.  No history of breast trauma.    OBJECTIVE                                                      /68   Pulse 86   Temp 98.6  F (37  C)   Wt 64 kg (141 lb)   LMP 01/05/2023 (Approximate)   SpO2 99%   BMI 25.38 kg/m    Constitutional: well-appearing  Breasts: normal appearance; no masses or skin retraction; no nipple discharge or bleeding; no axillary lymphadenopathy    ---    (Note documentation was completed, in part, with [employer] voice-recognition software. Documentation was reviewed, but some grammatical, spelling, and word errors may remain.)    "

## 2023-02-07 ENCOUNTER — HOSPITAL ENCOUNTER (OUTPATIENT)
Dept: ULTRASOUND IMAGING | Facility: CLINIC | Age: 28
Discharge: HOME | End: 2023-02-07
Attending: INTERNAL MEDICINE
Payer: COMMERCIAL

## 2023-02-07 ENCOUNTER — VIRTUAL VISIT (OUTPATIENT)
Dept: INTERNAL MEDICINE | Facility: CLINIC | Age: 28
End: 2023-02-07
Payer: COMMERCIAL

## 2023-02-07 DIAGNOSIS — N64.4 BREAST PAIN, LEFT: ICD-10-CM

## 2023-02-07 DIAGNOSIS — R23.4 BREAST SKIN CHANGES: Primary | ICD-10-CM

## 2023-02-07 PROCEDURE — 99213 OFFICE O/P EST LOW 20 MIN: CPT | Mod: 95 | Performed by: NURSE PRACTITIONER

## 2023-02-07 PROCEDURE — 76642 ULTRASOUND BREAST LIMITED: CPT | Mod: LT

## 2023-02-07 NOTE — PROGRESS NOTES
Karen is a 28 year old who is being evaluated via a billable video visit.      How would you like to obtain your AVS? Mail a copy  If the video visit is dropped, the invitation should be resent by: Text to cell phone: [phone]  Will anyone else be joining your video visit? Yes her mom Jyotsna        Assessment & Plan     Breast skin changes  having mammogram today   Physical scheduled         25 minutes spent on the date of the encounter doing chart review, history and exam, documentation and further activities per the note       Patient Instructions   Physical 2/16/2023 at 910 am       No follow-ups on file.    LCAIRE Parson CNP  [facility]    Hellen Jones is a 28 year old, presenting for the following health issues:  Chief Complaint   Patient presents with     Breast Problem     Bumpy around her left nipple and breast when she has her menstrual cycle.     Doing mammogram and ultrasound today     Want to schedule physical 2/16/2023 at 910 am      History of Present Illness       Reason for visit:  To check things    She eats 2-3 servings of fruits and vegetables daily.She consumes 1 sweetened beverage(s) daily.She exercises with enough effort to increase her heart rate 60 or more minutes per day.  She exercises with enough effort to increase her heart rate 5 days per week.   She is taking medications regularly.             Review of Systems   Constitutional, HEENT, cardiovascular, pulmonary, GI, , musculoskeletal, neuro, skin, endocrine and psych systems are negative, except as otherwise noted.      Objective           Vitals:  No vitals were obtained today due to virtual visit.    Physical Exam   GENERAL:  alert and no distress  EYES: Eyes grossly normal to inspection.  No discharge or erythema, or obvious scleral/conjunctival abnormalities.  RESP: No audible wheeze, cough, or visible cyanosis.  No visible retractions or increased work of breathing.    SKIN: Visible  skin clear. No significant rash, abnormal pigmentation or lesions.  She had breast exam done and mammogram and ultrasound   NEURO: Cranial nerves grossly intact.  Mentation and speech appropriate for age.  PSYCH: Mentation appears normal, affect normal/bright, judgement and insight intact, normal speech and appearance well-groomed.                Video-Visit Details    Type of service:  Video Visit   Video Start Time: 10:45 AM  Video End Time:11:00 AM    Originating Location (pt. Location): Home  Distant Location (provider location):  On-site  Platform used for Video Visit: Nathalie

## 2023-02-07 NOTE — NURSING NOTE
"Chief Complaint   Patient presents with     Breast Problem     Bumpy around her left nipple and breast when she has her menstrual cycle.     initial LMP 02/06/2023 (Exact Date)  Estimated body mass index is 25.38 kg/m  as calculated from the following:    Height as of 8/12/21: 1.588 m (5' 2.5\").    Weight as of 1/30/23: 64 kg (141 lb)..  bp completed using cuff size NA (Not Taken)  BHAKTI URRUTIA LPN  "

## 2023-02-16 ENCOUNTER — OFFICE VISIT (OUTPATIENT)
Dept: INTERNAL MEDICINE | Facility: CLINIC | Age: 28
End: 2023-02-16
Payer: COMMERCIAL

## 2023-02-16 VITALS
OXYGEN SATURATION: 99 % | SYSTOLIC BLOOD PRESSURE: 107 MMHG | RESPIRATION RATE: 16 BRPM | TEMPERATURE: 97.8 F | WEIGHT: 141 LBS | BODY MASS INDEX: 24.07 KG/M2 | HEIGHT: 64 IN | DIASTOLIC BLOOD PRESSURE: 64 MMHG | HEART RATE: 66 BPM

## 2023-02-16 DIAGNOSIS — Z83.3 FAMILY HISTORY OF DIABETES MELLITUS: ICD-10-CM

## 2023-02-16 DIAGNOSIS — Z12.4 CERVICAL CANCER SCREENING: ICD-10-CM

## 2023-02-16 DIAGNOSIS — Z11.3 SCREEN FOR STD (SEXUALLY TRANSMITTED DISEASE): ICD-10-CM

## 2023-02-16 DIAGNOSIS — E55.9 VITAMIN D DEFICIENCY: ICD-10-CM

## 2023-02-16 DIAGNOSIS — L70.0 ACNE VULGARIS: ICD-10-CM

## 2023-02-16 DIAGNOSIS — E56.9 VITAMIN DEFICIENCY: ICD-10-CM

## 2023-02-16 DIAGNOSIS — Z00.00 ROUTINE GENERAL MEDICAL EXAMINATION AT A HEALTH CARE FACILITY: Primary | ICD-10-CM

## 2023-02-16 LAB
ALBUMIN SERPL BCG-MCNC: 4.4 G/DL (ref 3.5–5.2)
ALP SERPL-CCNC: 83 U/L (ref 35–104)
ALT SERPL W P-5'-P-CCNC: 16 U/L (ref 10–35)
ANION GAP SERPL CALCULATED.3IONS-SCNC: 11 MMOL/L (ref 7–15)
AST SERPL W P-5'-P-CCNC: 23 U/L (ref 10–35)
BASOPHILS # BLD AUTO: 0 10E3/UL (ref 0–0.2)
BASOPHILS NFR BLD AUTO: 0 %
BILIRUB SERPL-MCNC: 0.3 MG/DL
BUN SERPL-MCNC: 14.6 MG/DL (ref 6–20)
CALCIUM SERPL-MCNC: 9.3 MG/DL (ref 8.6–10)
CHLORIDE SERPL-SCNC: 104 MMOL/L (ref 98–107)
CHOLEST SERPL-MCNC: 160 MG/DL
CLUE CELLS: ABNORMAL
CREAT SERPL-MCNC: 0.6 MG/DL (ref 0.51–0.95)
DEPRECATED HCO3 PLAS-SCNC: 25 MMOL/L (ref 22–29)
EOSINOPHIL # BLD AUTO: 0.1 10E3/UL (ref 0–0.7)
EOSINOPHIL NFR BLD AUTO: 1 %
ERYTHROCYTE [DISTWIDTH] IN BLOOD BY AUTOMATED COUNT: 11.7 % (ref 10–15)
GFR SERPL CREATININE-BSD FRML MDRD: >90 ML/MIN/1.73M2
GLUCOSE SERPL-MCNC: 80 MG/DL (ref 70–99)
HBA1C MFR BLD: 5.1 % (ref 0–5.6)
HCT VFR BLD AUTO: 40.7 % (ref 35–47)
HDLC SERPL-MCNC: 73 MG/DL
HGB BLD-MCNC: 13.8 G/DL (ref 11.7–15.7)
IMM GRANULOCYTES # BLD: 0 10E3/UL
IMM GRANULOCYTES NFR BLD: 0 %
LDLC SERPL CALC-MCNC: 79 MG/DL
LYMPHOCYTES # BLD AUTO: 1.5 10E3/UL (ref 0.8–5.3)
LYMPHOCYTES NFR BLD AUTO: 28 %
MCH RBC QN AUTO: 33.3 PG (ref 26.5–33)
MCHC RBC AUTO-ENTMCNC: 33.9 G/DL (ref 31.5–36.5)
MCV RBC AUTO: 98 FL (ref 78–100)
MONOCYTES # BLD AUTO: 0.5 10E3/UL (ref 0–1.3)
MONOCYTES NFR BLD AUTO: 9 %
NEUTROPHILS # BLD AUTO: 3.4 10E3/UL (ref 1.6–8.3)
NEUTROPHILS NFR BLD AUTO: 61 %
NONHDLC SERPL-MCNC: 87 MG/DL
PLATELET # BLD AUTO: 179 10E3/UL (ref 150–450)
POTASSIUM SERPL-SCNC: 4.4 MMOL/L (ref 3.4–5.3)
PROT SERPL-MCNC: 7.2 G/DL (ref 6.4–8.3)
RBC # BLD AUTO: 4.15 10E6/UL (ref 3.8–5.2)
SODIUM SERPL-SCNC: 140 MMOL/L (ref 136–145)
TRICHOMONAS, WET PREP: ABNORMAL
TRIGL SERPL-MCNC: 41 MG/DL
TSH SERPL DL<=0.005 MIU/L-ACNC: 0.52 UIU/ML (ref 0.3–4.2)
VIT B12 SERPL-MCNC: 812 PG/ML (ref 232–1245)
WBC # BLD AUTO: 5.5 10E3/UL (ref 4–11)
WBC'S/HIGH POWER FIELD, WET PREP: ABNORMAL
YEAST, WET PREP: ABNORMAL

## 2023-02-16 PROCEDURE — 87491 CHLMYD TRACH DNA AMP PROBE: CPT | Performed by: NURSE PRACTITIONER

## 2023-02-16 PROCEDURE — 80061 LIPID PANEL: CPT | Performed by: NURSE PRACTITIONER

## 2023-02-16 PROCEDURE — 82607 VITAMIN B-12: CPT | Performed by: NURSE PRACTITIONER

## 2023-02-16 PROCEDURE — 86696 HERPES SIMPLEX TYPE 2 TEST: CPT | Performed by: NURSE PRACTITIONER

## 2023-02-16 PROCEDURE — 87389 HIV-1 AG W/HIV-1&-2 AB AG IA: CPT | Performed by: NURSE PRACTITIONER

## 2023-02-16 PROCEDURE — G0124 SCREEN C/V THIN LAYER BY MD: HCPCS | Performed by: PATHOLOGY

## 2023-02-16 PROCEDURE — 86780 TREPONEMA PALLIDUM: CPT | Performed by: NURSE PRACTITIONER

## 2023-02-16 PROCEDURE — 86695 HERPES SIMPLEX TYPE 1 TEST: CPT | Performed by: NURSE PRACTITIONER

## 2023-02-16 PROCEDURE — 87210 SMEAR WET MOUNT SALINE/INK: CPT | Performed by: NURSE PRACTITIONER

## 2023-02-16 PROCEDURE — 99000 SPECIMEN HANDLING OFFICE-LAB: CPT | Performed by: NURSE PRACTITIONER

## 2023-02-16 PROCEDURE — 84207 ASSAY OF VITAMIN B-6: CPT | Mod: 90 | Performed by: NURSE PRACTITIONER

## 2023-02-16 PROCEDURE — 87591 N.GONORRHOEAE DNA AMP PROB: CPT | Performed by: NURSE PRACTITIONER

## 2023-02-16 PROCEDURE — G0145 SCR C/V CYTO,THINLAYER,RESCR: HCPCS | Performed by: NURSE PRACTITIONER

## 2023-02-16 PROCEDURE — 82306 VITAMIN D 25 HYDROXY: CPT | Performed by: NURSE PRACTITIONER

## 2023-02-16 PROCEDURE — 36415 COLL VENOUS BLD VENIPUNCTURE: CPT | Performed by: NURSE PRACTITIONER

## 2023-02-16 PROCEDURE — 87624 HPV HI-RISK TYP POOLED RSLT: CPT | Performed by: NURSE PRACTITIONER

## 2023-02-16 PROCEDURE — 83036 HEMOGLOBIN GLYCOSYLATED A1C: CPT | Performed by: NURSE PRACTITIONER

## 2023-02-16 PROCEDURE — 99395 PREV VISIT EST AGE 18-39: CPT | Performed by: NURSE PRACTITIONER

## 2023-02-16 PROCEDURE — 80050 GENERAL HEALTH PANEL: CPT | Performed by: NURSE PRACTITIONER

## 2023-02-16 ASSESSMENT — ENCOUNTER SYMPTOMS
PALPITATIONS: 0
WEAKNESS: 0
SHORTNESS OF BREATH: 0
HEMATOCHEZIA: 0
ABDOMINAL PAIN: 0
DIZZINESS: 0
COUGH: 0
CHILLS: 0
MYALGIAS: 0
FEVER: 0
HEADACHES: 0
BREAST MASS: 0
EYE PAIN: 0
CONSTIPATION: 0
HEARTBURN: 0
DIARRHEA: 0
NERVOUS/ANXIOUS: 0
PARESTHESIAS: 0
HEMATURIA: 0
SORE THROAT: 0
ARTHRALGIAS: 0
DYSURIA: 0
NAUSEA: 0
JOINT SWELLING: 0
FREQUENCY: 0

## 2023-02-16 NOTE — PROGRESS NOTES
SUBJECTIVE:   CC: Karen is an 28 year old who presents for preventive health visit.   Patient has been advised of split billing requirements and indicates understanding: Yes  Healthy Habits:     Getting at least 3 servings of Calcium per day:  Yes    Bi-annual eye exam:  NO    Dental care twice a year:  Yes    Sleep apnea or symptoms of sleep apnea:  None    Diet:  Regular (no restrictions)    Frequency of exercise:  4-5 days/week    Duration of exercise:  Greater than 60 minutes    Taking medications regularly:  Yes    Medication side effects:  None    PHQ-2 Total Score: 0    Additional concerns today:  No              Today's PHQ-2 Score:   PHQ-2 ( 1999 [employer]) 2/16/2023   Q1: Little interest or pleasure in doing things 0   Q2: Feeling down, depressed or hopeless 0   PHQ-2 Score 0   PHQ-2 Total Score (12-17 Years)- Positive if 3 or more points; Administer PHQ-A if positive -   Q1: Little interest or pleasure in doing things Not at all   Q2: Feeling down, depressed or hopeless Not at all   PHQ-2 Score 0       Have you ever done Advance Care Planning? (For example, a Health Directive, POLST, or a discussion with a medical provider or your loved ones about your wishes): No, advance care planning information given to patient to review.  Patient declined advance care planning discussion at this time.    Social History     Tobacco Use     Smoking status: Never     Smokeless tobacco: Never   Substance Use Topics     Alcohol use: No     If you drink alcohol do you typically have >3 drinks per day or >7 drinks per week? No    Alcohol Use 2/16/2023   Prescreen: >3 drinks/day or >7 drinks/week? No   Prescreen: >3 drinks/day or >7 drinks/week? -       Reviewed orders with patient.  Reviewed health maintenance and updated orders accordingly -       Breast Cancer Screening:  Any new diagnosis of family breast, ovarian, or bowel cancer?     FHS-7:   Breast CA Risk Assessment (FHS-7) 2/7/2023   Did any of your first-degree  "relatives have breast or ovarian cancer? No   Did any of your relatives have bilateral breast cancer? No   Did any man in your family have breast cancer? No   Did any woman in your family have breast and ovarian cancer? No   Did any woman in your family have breast cancer before age 50 y? No   Do you have 2 or more relatives with breast and/or ovarian cancer? No   Do you have 2 or more relatives with breast and/or bowel cancer? No         Pertinent mammograms are reviewed under the imaging tab.    History of abnormal Pap smear: pap ?     Reviewed and updated as needed this visit by clinical staff   Tobacco  Allergies  Meds  Problems  Med Hx  Surg Hx  Fam Hx          Reviewed and updated as needed this visit by Provider    Allergies                  Review of Systems   Constitutional: Negative for chills and fever.   HENT: Negative for congestion, ear pain, hearing loss and sore throat.    Eyes: Negative for pain and visual disturbance.   Respiratory: Negative for cough and shortness of breath.    Cardiovascular: Negative for chest pain, palpitations and peripheral edema.   Gastrointestinal: Negative for abdominal pain, constipation, diarrhea, heartburn, hematochezia and nausea.   Breasts:  Negative for tenderness, breast mass and discharge.   Genitourinary: Negative for dysuria, frequency, genital sores, hematuria, pelvic pain, urgency, vaginal bleeding and vaginal discharge.   Musculoskeletal: Negative for arthralgias, joint swelling and myalgias.   Skin: Negative for rash.   Neurological: Negative for dizziness, weakness, headaches and paresthesias.   Psychiatric/Behavioral: Negative for mood changes. The patient is not nervous/anxious.           OBJECTIVE:   /64 (BP Location: Left arm, Patient Position: Sitting, Cuff Size: Adult Regular)   Pulse 66   Temp 97.8  F (36.6  C) (Oral)   Resp 16   Ht 1.626 m (5' 4\")   Wt 64 kg (141 lb)   LMP 02/06/2023 (Exact Date)   SpO2 99%   BMI 24.20 kg/m  "   Physical Exam  GENERAL: alert and no distress  EYES: Eyes grossly normal to inspection, PERRL and conjunctivae and sclerae normal  HENT: ear canals and TM's normal, nose and mouth without ulcers or lesions  NECK: no adenopathy, no asymmetry, masses, or scars and thyroid normal to palpation  RESP: lungs clear to auscultation - no rales, rhonchi or wheezes  BREAST: normal without masses, tenderness or nipple discharge and no palpable axillary masses or adenopathy  CV: regular rate and rhythm, normal S1 S2, no S3 or S4, no murmur, click or rub, no peripheral edema and peripheral pulses strong  ABDOMEN: soft, nontender, no hepatosplenomegaly, no masses and bowel sounds normal   (female): normal female external genitalia, normal urethral meatus, vaginal mucosa pink, moist, well rugated, and normal cervix/adnexa/uterus without masses or discharge  MS: no gross musculoskeletal defects noted, no edema  SKIN: no suspicious lesions or rashes  NEURO: Normal strength and tone, mentation intact and speech normal  PSYCH: mentation appears normal, affect normal/bright    Diagnostic Test Results:  Labs reviewed in Epic  Lab   PAP   Wet prep     ASSESSMENT/PLAN:   (Z00.00) Routine general medical examination at a health care facility  (primary encounter diagnosis)  Comment:   Plan: CBC with platelets and differential,         Comprehensive metabolic panel (BMP + Alb, Alk         Phos, ALT, AST, Total. Bili, TP), TSH with free        T4 reflex, CBC with platelets and differential,        Lipid panel reflex to direct LDL Fasting            (Z12.4) Cervical cancer screening  Comment: not sure she has had in past   Plan: Pap Screen reflex to HPV if ASCUS - recommend         age 25 - 29            (L70.0) Acne vulgaris  Comment: better off of foods   Plan:     (E55.9) Vitamin D deficiency  Comment:   Plan: Vitamin D Deficiency            (E56.9) Vitamin deficiency  Comment: b 6 high   Plan: Vitamin B6, Vitamin B12, Vitamin D  Deficiency            (Z83.3) Family history of diabetes mellitus  Comment:   Plan: Comprehensive metabolic panel (BMP + Alb, Alk         Phos, ALT, AST, Total. Bili, TP), Hemoglobin         A1c            (Z11.3) Screen for STD (sexually transmitted disease)  Comment:   Plan: NEISSERIA GONORRHOEA PCR, CHLAMYDIA TRACHOMATIS        PCR, Wet prep - Clinic Collect, Treponema Abs w        Reflex to RPR and Titer, HIV Antigen Antibody         Combo, Herpes Simplex Virus 1 and 2 IgG                    COUNSELING:  Reviewed preventive health counseling, as reflected in patient instructions       Regular exercise       Healthy diet/nutrition       Osteoporosis prevention/bone health       Safe sex practices/STD prevention        She reports that she has never smoked. She has never used smokeless tobacco.      CLAIRE Parson CNP  [facility]

## 2023-02-16 NOTE — LETTER
[place] [address], [address]  [place] [address]  [phone]        February 20, 2023    Karen Soliz  [address]   [facility] [address]            Dear Ms. Karen Soliz:    Labs to this point are all normal/acceptable.  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  Your STD tests are negative.  Your B6 is still pending.         If you have any further questions or problems, please contact our office.    Sincerely,        Alayna Bonner N.P.APOLLO    Results for orders placed or performed in visit on 02/16/23   Vitamin B6     Status: None   Result Value Ref Range    Vitamin B6 74.9 20.0 - 125.0 nmol/L   Vitamin B12     Status: Normal   Result Value Ref Range    Vitamin B12 812 232 - 1,245 pg/mL   Comprehensive metabolic panel (BMP + Alb, Alk Phos, ALT, AST, Total. Bili, TP)     Status: Normal   Result Value Ref Range    Sodium 140 136 - 145 mmol/L    Potassium 4.4 3.4 - 5.3 mmol/L    Chloride 104 98 - 107 mmol/L    Carbon Dioxide (CO2) 25 22 - 29 mmol/L    Anion Gap 11 7 - 15 mmol/L    Urea Nitrogen 14.6 6.0 - 20.0 mg/dL    Creatinine 0.60 0.51 - 0.95 mg/dL    Calcium 9.3 8.6 - 10.0 mg/dL    Glucose 80 70 - 99 mg/dL    Alkaline Phosphatase 83 35 - 104 U/L    AST 23 10 - 35 U/L    ALT 16 10 - 35 U/L    Protein Total 7.2 6.4 - 8.3 g/dL    Albumin 4.4 3.5 - 5.2 g/dL    Bilirubin Total 0.3 <=1.2 mg/dL    GFR Estimate >90 >60 mL/min/1.73m2   TSH with free T4 reflex     Status: Normal   Result Value Ref Range    TSH 0.52 0.30 - 4.20 uIU/mL   Vitamin D Deficiency     Status: Normal   Result Value Ref Range    Vitamin D, Total (25-Hydroxy) 46 20 - 75 ug/L    Narrative    Season, race, dietary intake, and treatment affect the concentration of 25-hydroxy-Vitamin D. Values may decrease during winter months and increase during summer months. Values 20-29 ug/L may indicate Vitamin D insufficiency and values <20 ug/L may indicate Vitamin D  deficiency.    Vitamin D determination is routinely performed by an immunoassay specific for 25 hydroxyvitamin D3.  If an individual is on vitamin D2(ergocalciferol) supplementation, please specify 25 OH vitamin D2 and D3 level determination by LCMSMS test VITD23.     Lipid panel reflex to direct LDL Fasting     Status: Normal   Result Value Ref Range    Cholesterol 160 <200 mg/dL    Triglycerides 41 <150 mg/dL    Direct Measure HDL 73 >=50 mg/dL    LDL Cholesterol Calculated 79 <=100 mg/dL    Non HDL Cholesterol 87 <130 mg/dL    Narrative    Cholesterol  Desirable:  <200 mg/dL    Triglycerides  Normal:  Less than 150 mg/dL  Borderline High:  150-199 mg/dL  High:  200-499 mg/dL  Very High:  Greater than or equal to 500 mg/dL    Direct Measure HDL  Female:  Greater than or equal to 50 mg/dL   Male:  Greater than or equal to 40 mg/dL    LDL Cholesterol  Desirable:  <100mg/dL  Above Desirable:  100-129 mg/dL   Borderline High:  130-159 mg/dL   High:  160-189 mg/dL   Very High:  >= 190 mg/dL    Non HDL Cholesterol  Desirable:  130 mg/dL  Above Desirable:  130-159 mg/dL  Borderline High:  160-189 mg/dL  High:  190-219 mg/dL  Very High:  Greater than or equal to 220 mg/dL   Hemoglobin A1c     Status: Normal   Result Value Ref Range    Hemoglobin A1C 5.1 0.0 - 5.6 %   Treponema Abs w Reflex to RPR and Titer     Status: Normal   Result Value Ref Range    Treponema Antibody Total Nonreactive Nonreactive   HIV Antigen Antibody Combo     Status: Normal   Result Value Ref Range    HIV Antigen Antibody Combo Nonreactive Nonreactive   Herpes Simplex Virus 1 and 2 IgG     Status: Abnormal   Result Value Ref Range    HSV Type 1 IgG Instrument Value 48.70 (H) <0.90 Index    Herpes Simplex Virus Type 1 IgG Antibody Positive.  IgG antibody to HSV-1 detected. (A) No HSV-1 IgG antibodies detected    HSV Type 2 IgG Instrument Value 0.61 <0.90 Index    Herpes Simplex Virus Type 2 IgG Antibody No HSV-2 IgG antibodies detected. No HSV-2 IgG  antibodies detected   CBC with platelets and differential     Status: Abnormal   Result Value Ref Range    WBC Count 5.5 4.0 - 11.0 10e3/uL    RBC Count 4.15 3.80 - 5.20 10e6/uL    Hemoglobin 13.8 11.7 - 15.7 g/dL    Hematocrit 40.7 35.0 - 47.0 %    MCV 98 78 - 100 fL    MCH 33.3 (H) 26.5 - 33.0 pg    MCHC 33.9 31.5 - 36.5 g/dL    RDW 11.7 10.0 - 15.0 %    Platelet Count 179 150 - 450 10e3/uL    % Neutrophils 61 %    % Lymphocytes 28 %    % Monocytes 9 %    % Eosinophils 1 %    % Basophils 0 %    % Immature Granulocytes 0 %    Absolute Neutrophils 3.4 1.6 - 8.3 10e3/uL    Absolute Lymphocytes 1.5 0.8 - 5.3 10e3/uL    Absolute Monocytes 0.5 0.0 - 1.3 10e3/uL    Absolute Eosinophils 0.1 0.0 - 0.7 10e3/uL    Absolute Basophils 0.0 0.0 - 0.2 10e3/uL    Absolute Immature Granulocytes 0.0 <=0.4 10e3/uL   NEISSERIA GONORRHOEA PCR     Status: Normal    Specimen: Cervix; Swab   Result Value Ref Range    Neisseria gonorrhoeae Negative Negative   CHLAMYDIA TRACHOMATIS PCR     Status: Normal    Specimen: Cervix; Swab   Result Value Ref Range    Chlamydia trachomatis Negative Negative   Wet prep - Clinic Collect     Status: Abnormal    Specimen: Vagina; Swab   Result Value Ref Range    Trichomonas Absent Absent    Yeast Absent Absent    Clue Cells Absent Absent    WBCs/high power field 1+ (A) None   CBC with platelets and differential     Status: Abnormal    Narrative    The following orders were created for panel order CBC with platelets and differential.  Procedure                               Abnormality         Status                     ---------                               -----------         ------                     CBC with platelets and d...[[id number]]  Abnormal            Final result                 Please view results for these tests on the individual orders.

## 2023-02-17 LAB
C TRACH DNA SPEC QL NAA+PROBE: NEGATIVE
DEPRECATED CALCIDIOL+CALCIFEROL SERPL-MC: 46 UG/L (ref 20–75)
HIV 1+2 AB+HIV1 P24 AG SERPL QL IA: NONREACTIVE
HSV1 IGG SERPL QL IA: 48.7 INDEX
HSV1 IGG SERPL QL IA: ABNORMAL
HSV2 IGG SERPL QL IA: 0.61 INDEX
HSV2 IGG SERPL QL IA: ABNORMAL
N GONORRHOEA DNA SPEC QL NAA+PROBE: NEGATIVE
T PALLIDUM AB SER QL: NONREACTIVE

## 2023-02-20 LAB — PYRIDOXAL PHOS SERPL-SCNC: 74.9 NMOL/L

## 2023-02-21 LAB
BKR LAB AP GYN ADEQUACY: ABNORMAL
BKR LAB AP GYN INTERPRETATION: ABNORMAL
BKR LAB AP HPV REFLEX: ABNORMAL
BKR LAB AP PREVIOUS ABNORMAL: ABNORMAL
PATH REPORT.COMMENTS IMP SPEC: ABNORMAL
PATH REPORT.COMMENTS IMP SPEC: ABNORMAL
PATH REPORT.RELEVANT HX SPEC: ABNORMAL

## 2023-02-23 LAB
HUMAN PAPILLOMA VIRUS 16 DNA: NEGATIVE
HUMAN PAPILLOMA VIRUS 18 DNA: NEGATIVE
HUMAN PAPILLOMA VIRUS FINAL DIAGNOSIS: ABNORMAL
HUMAN PAPILLOMA VIRUS OTHER HR: POSITIVE

## 2023-02-24 ENCOUNTER — PATIENT OUTREACH (OUTPATIENT)
Dept: INTERNAL MEDICINE | Facility: CLINIC | Age: 28
End: 2023-02-24
Payer: COMMERCIAL

## 2023-03-03 ENCOUNTER — OFFICE VISIT (OUTPATIENT)
Dept: OBGYN | Facility: CLINIC | Age: 28
End: 2023-03-03
Payer: COMMERCIAL

## 2023-03-03 VITALS — SYSTOLIC BLOOD PRESSURE: 100 MMHG | BODY MASS INDEX: 24.37 KG/M2 | DIASTOLIC BLOOD PRESSURE: 68 MMHG | WEIGHT: 142 LBS

## 2023-03-03 DIAGNOSIS — R87.610 ASCUS WITH POSITIVE HIGH RISK HPV CERVICAL: Primary | ICD-10-CM

## 2023-03-03 DIAGNOSIS — R87.810 ASCUS WITH POSITIVE HIGH RISK HPV CERVICAL: Primary | ICD-10-CM

## 2023-03-03 DIAGNOSIS — Z01.812 PRE-PROCEDURE LAB EXAM: ICD-10-CM

## 2023-03-03 LAB — HCG IFA URINE: NEGATIVE

## 2023-03-03 PROCEDURE — 84703 CHORIONIC GONADOTROPIN ASSAY: CPT | Performed by: OBSTETRICS & GYNECOLOGY

## 2023-03-03 PROCEDURE — 88305 TISSUE EXAM BY PATHOLOGIST: CPT | Performed by: PATHOLOGY

## 2023-03-03 PROCEDURE — 57454 BX/CURETT OF CERVIX W/SCOPE: CPT | Performed by: OBSTETRICS & GYNECOLOGY

## 2023-03-03 NOTE — PROGRESS NOTES
Colposcopy Note    Past History:  No obstetric history on file.   Patient's last menstrual period was 02/06/2023 (exact date).  Patient is not pregnant.     Previous Abnormal Pap Hx:  Abnormal Pap dated: February / 16 / 2023 Pap-ASCUS, Pos non-16/18 HPV  Prior Colposcopy dated: N/A  Prior Treatment dated: N/A    Indications:  Encounter Diagnoses   Name Primary?     ASCUS with positive high risk HPV cervical Yes     Pre-procedure lab exam        PROCEDURE:  The procedure was explained, and informed consent obtained. The patient was placed in the dorsal lithotomy position. A vaginal speculum was placed. A GC/Chlamydia culture was not obtained. Dilute acetic acid was applied to cervix. The exocervix was visualized. The transformation zone was visualized satisfactorily. Colposcopy was done with white light and with the Kasie light. Endocervical curettage was done. Biopsy done at the 5 o clock position(s) Colposcopy of vagina revealed: normal. The patient tolerated the procedure well. At the completion of the procedure, only scant bleeding was present. Hemostasis was achieved with Monsel's solution.    FINDINGS:  White epithelium @ 5 o clock position(s).  Punctation: absent  Mosaicism: absent    Physical Exam  Genitourinary:              ASSESSMENT:  Encounter Diagnoses   Name Primary?     ASCUS with positive high risk HPV cervical Yes     Pre-procedure lab exam        PLAN:  If Bx shows EM 1 or less, follow-up with Ob/Gyn per [facility]P Guideline in 1 year for Pap & HPV cotest at next annual exam.     Procedure Planned:   If HGSIL, consider Laser vaporization.    Ian Nascimento MD

## 2023-03-03 NOTE — NURSING NOTE
"Chief Complaint   Patient presents with     Colposcopy     initial /68   Wt 64.4 kg (142 lb)   LMP 02/06/2023 (Exact Date)   BMI 24.37 kg/m   Estimated body mass index is 24.37 kg/m  as calculated from the following:    Height as of 2/16/23: 1.626 m (5' 4\").    Weight as of this encounter: 64.4 kg (142 lb).  BP completed using cuff size regular.  Amanda Kemp CMA    "

## 2023-03-07 LAB
PATH REPORT.COMMENTS IMP SPEC: NORMAL
PATH REPORT.COMMENTS IMP SPEC: NORMAL
PATH REPORT.FINAL DX SPEC: NORMAL
PATH REPORT.GROSS SPEC: NORMAL
PATH REPORT.MICROSCOPIC SPEC OTHER STN: NORMAL
PATH REPORT.RELEVANT HX SPEC: NORMAL
PHOTO IMAGE: NORMAL

## 2023-03-07 NOTE — RESULT ENCOUNTER NOTE
Please advise patient her colposcopic biopsies from her cervix showed no dysplasia nor cervical cancer.  She does not need anything else done at this time.  She only needs to follow up in 1 year for her annual exam, and at that time a Pap with HPV DNA test will be done again from her cervix to check to see if the cervix cells are normal and the HPV virus has been eradicated.  If so she will resume routine screening 3 years later.  She can let me know if she has any other questions.    Ian Nascimento MD

## 2023-04-14 ENCOUNTER — PATIENT OUTREACH (OUTPATIENT)
Dept: OBGYN | Facility: CLINIC | Age: 28
End: 2023-04-14
Payer: COMMERCIAL

## 2023-07-10 ENCOUNTER — TELEPHONE (OUTPATIENT)
Dept: INTERNAL MEDICINE | Facility: CLINIC | Age: 28
End: 2023-07-10
Payer: COMMERCIAL

## 2023-07-10 NOTE — TELEPHONE ENCOUNTER
General Call    Contacts       Type Contact Phone/Fax    07/10/2023 11:08 AM CDT Phone (Incoming) HeydiKaren (Self) [phone] (M)        Reason for Call:  Is pcp here?    What are your questions or concerns:  Wants to know if pcp is working right now and is available or not    Date of last appointment with provider: 2/16/23    Okay to leave a detailed message?: Yes at Cell number on file:    Telephone Information:   Mobile [phone]

## 2023-11-04 ENCOUNTER — HOSPITAL ENCOUNTER (EMERGENCY)
Facility: CLINIC | Age: 28
Discharge: HOME | End: 2023-11-04
Attending: EMERGENCY MEDICINE | Admitting: EMERGENCY MEDICINE
Payer: COMMERCIAL

## 2023-11-04 VITALS
OXYGEN SATURATION: 99 % | HEART RATE: 83 BPM | SYSTOLIC BLOOD PRESSURE: 105 MMHG | RESPIRATION RATE: 16 BRPM | TEMPERATURE: 97.3 F | DIASTOLIC BLOOD PRESSURE: 67 MMHG | BODY MASS INDEX: 24.03 KG/M2 | WEIGHT: 140 LBS
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
## 2023-11-04 DIAGNOSIS — S00.83XA CONTUSION OF CHIN, INITIAL ENCOUNTER: ICD-10-CM

## 2023-11-04 PROCEDURE — 250N000013 HC RX MED GY IP 250 OP 250 PS 637: Performed by: EMERGENCY MEDICINE

## 2023-11-04 PROCEDURE — 99283 EMERGENCY DEPT VISIT LOW MDM: CPT

## 2023-11-04 RX ORDER — IBUPROFEN 600 MG/1
600 TABLET, FILM COATED ORAL ONCE
Status: COMPLETED | OUTPATIENT
Start: 2023-11-04 | End: 2023-11-04

## 2023-11-04 RX ORDER — ACETAMINOPHEN 500 MG
1000 TABLET ORAL ONCE
Status: COMPLETED | OUTPATIENT
Start: 2023-11-04 | End: 2023-11-04

## 2023-11-04 RX ADMIN — IBUPROFEN 600 MG: 600 TABLET ORAL at 14:09

## 2023-11-04 RX ADMIN — ACETAMINOPHEN 1000 MG: 500 TABLET, FILM COATED ORAL at 14:09
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
## 2023-11-04 NOTE — ED TRIAGE NOTES
"Pt states she wants a full CT scan and is not pregnant, period last week. Pt moves joints easily taking off coat for vital signs in triage. Complains of pain \"Everywhere\" \"a10\".         "
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
## 2023-11-04 NOTE — LETTER
November 4, 2023      To Whom It May Concern:      Karen Soliz was seen in our Emergency Department today, 11/04/23.  I expect her condition to improve over the next 3 days.  She may return to work/school when improved.    Sincerely,        Ignacio Escobar, DO

## 2024-01-17 ENCOUNTER — PATIENT OUTREACH (OUTPATIENT)
Dept: CARE COORDINATION | Facility: CLINIC | Age: 29
End: 2024-01-17
Payer: COMMERCIAL

## 2024-01-31 ENCOUNTER — PATIENT OUTREACH (OUTPATIENT)
Dept: CARE COORDINATION | Facility: CLINIC | Age: 29
End: 2024-01-31
Payer: COMMERCIAL

## 2024-02-04 ENCOUNTER — OFFICE VISIT (OUTPATIENT)
Dept: URGENT CARE | Facility: URGENT CARE | Age: 29
End: 2024-02-04
Payer: COMMERCIAL

## 2024-02-04 VITALS
SYSTOLIC BLOOD PRESSURE: 100 MMHG | WEIGHT: 148 LBS | DIASTOLIC BLOOD PRESSURE: 66 MMHG | HEART RATE: 74 BPM | BODY MASS INDEX: 25.4 KG/M2 | OXYGEN SATURATION: 99 % | TEMPERATURE: 99.4 F

## 2024-02-04 DIAGNOSIS — R13.10 ODYNOPHAGIA: ICD-10-CM

## 2024-02-04 DIAGNOSIS — J02.9 PHARYNGITIS, UNSPECIFIED ETIOLOGY: Primary | ICD-10-CM

## 2024-02-04 LAB — DEPRECATED S PYO AG THROAT QL EIA: NEGATIVE

## 2024-02-04 PROCEDURE — 87651 STREP A DNA AMP PROBE: CPT | Performed by: EMERGENCY MEDICINE

## 2024-02-04 PROCEDURE — 99213 OFFICE O/P EST LOW 20 MIN: CPT | Performed by: EMERGENCY MEDICINE

## 2024-02-04 RX ORDER — DEXAMETHASONE SODIUM PHOSPHATE 10 MG/ML
6 INJECTION INTRAMUSCULAR; INTRAVENOUS ONCE
Status: COMPLETED | OUTPATIENT
Start: 2024-02-04 | End: 2024-02-04

## 2024-02-04 RX ADMIN — DEXAMETHASONE SODIUM PHOSPHATE 6 MG: 10 INJECTION INTRAMUSCULAR; INTRAVENOUS at 17:01

## 2024-02-04 NOTE — PROGRESS NOTES
Assessment & Plan     Diagnosis:    ICD-10-CM    1. Pharyngitis, unspecified etiology  J02.9 Streptococcus A Rapid Screen w/Reflex to PCR - Clinic Collect     Group A Streptococcus PCR Throat Swab     dexAMETHasone (DECADRON) injectable solution used ORALLY 6 mg      2. Odynophagia  R13.10 dexAMETHasone (DECADRON) injectable solution used ORALLY 6 mg          Medical Decision Making:  Karen Soliz is a 29 year old female presented with sore throat and clinical evidence of pharyngitis.  The rapid strep test is negative, and formal culture has been set up in the lab. There is no clinical evidence of  peritonsillar abscess, retropharyngeal abscess, epiglottis, or Louis's angina. The etiology is most likely viral.  I have recommended treatment with analgesics, and we will await formal culture results.  If the culture is positive, a provider will call the patient to initiate anti-microbial therapy. Go to the ER if increasing pain, change in voice, neck pain, vomiting, fever, or shortness of breath. Follow-up with primary physician if not improving in 3-5 days. All questions answered. Patient verbalized understanding and agreement with the plan.      Albino George PA-C  [facility]    Subjective     Karen Soliz is a 29 year old female who presents to clinic today for the following health issues:  Chief Complaint   Patient presents with    Pharyngitis     x 1 month on and off        HPI  Patient with 1 month of intermittent sore throat, seems that she is getting back-to-back infections.  Most recent infection started a few days ago, has been getting progressively worse, swollen throat, difficulty swallowing.  She denies any difficulties breathing, cough, fevers, ear pain.  Is able to swallow food and water, but is very painful.      Review of Systems    See HPI    Objective      Vitals: /66 (BP Location: Left arm, Patient Position: Sitting, Cuff Size: Adult Regular)   Pulse 74   Temp 99.4   F (37.4  C) (Tympanic)   Wt 67.1 kg (148 lb)   LMP 01/15/2024 (Approximate)   SpO2 99%   BMI 25.40 kg/m      Patient Vitals for the past 24 hrs:   BP Temp Temp src Pulse SpO2 Weight   02/04/24 1624 100/66 99.4  F (37.4  C) Tympanic 74 99 % 67.1 kg (148 lb)       Vital signs reviewed by: Albino George PA-C    Physical Exam   Constitutional: Alert and active. No acute distress.  Non-toxic appearing.  HENT:   Right Ear: External ear normal. TM: gray/pale appearing  Left Ear: External ear normal. TM: gray/pale appearing.  Nose: Nose normal.    Eyes: Conjunctivae, EOM and lids are normal.   Mouth: Mucous membranes are moist. Posterior oropharynx is erythematous. Exudates not present. Tonsils are symmetrically enlarged. Uvula is midline. No submandibular swelling or erythema.  Neck: Normal ROM. No meningismus.   Cardiovascular: Regular rate and rhythm  Pulmonary/Chest: Effort normal. No respiratory distress. Lungs are clear to auscultation throughout.  Musculoskeletal: Normal range of motion.   Neurological: Alert and appropriately oriented for age.   Skin: No rash noted on visualized skin.       Labs/Imaging:  Results for orders placed or performed in visit on 02/04/24   Streptococcus A Rapid Screen w/Reflex to PCR - Clinic Collect     Status: Normal    Specimen: Throat; Swab   Result Value Ref Range    Group A Strep antigen Negative Negative       Interventions:  Decadron 6mg PO      Albino George PA-C, February 4, 2024

## 2024-02-05 LAB — GROUP A STREP BY PCR: NOT DETECTED

## 2024-02-06 ENCOUNTER — OFFICE VISIT (OUTPATIENT)
Dept: INTERNAL MEDICINE | Facility: CLINIC | Age: 29
End: 2024-02-06
Payer: COMMERCIAL

## 2024-02-06 ENCOUNTER — TELEPHONE (OUTPATIENT)
Dept: INTERNAL MEDICINE | Facility: CLINIC | Age: 29
End: 2024-02-06
Payer: COMMERCIAL

## 2024-02-06 ENCOUNTER — TELEPHONE (OUTPATIENT)
Dept: INTERNAL MEDICINE | Facility: CLINIC | Age: 29
End: 2024-02-06

## 2024-02-06 VITALS
WEIGHT: 146 LBS | OXYGEN SATURATION: 98 % | DIASTOLIC BLOOD PRESSURE: 57 MMHG | RESPIRATION RATE: 16 BRPM | HEIGHT: 63 IN | SYSTOLIC BLOOD PRESSURE: 94 MMHG | TEMPERATURE: 98.4 F | BODY MASS INDEX: 25.87 KG/M2 | HEART RATE: 70 BPM

## 2024-02-06 DIAGNOSIS — J02.9 SORE THROAT: Primary | ICD-10-CM

## 2024-02-06 LAB
DEPRECATED S PYO AG THROAT QL EIA: NEGATIVE
GROUP A STREP BY PCR: NOT DETECTED

## 2024-02-06 PROCEDURE — 87651 STREP A DNA AMP PROBE: CPT

## 2024-02-06 PROCEDURE — 99214 OFFICE O/P EST MOD 30 MIN: CPT

## 2024-02-06 NOTE — NURSING NOTE
Lab called : they were unable to draw blood ,patient states she will return Thursday to try again .    FELICIA Dias LPN

## 2024-02-06 NOTE — TELEPHONE ENCOUNTER
Patient calls back to request appointment with same day provider, April Conway NP.    April is fully booked today, but Jacquie Moser has opening. Appointment scheduled.     Appointments in Next Year      Feb 06, 2024  1:30 PM  (Arrive by 1:10 PM)  Provider Visit with CLAIRE Ly CNP  [facility] ([facility] [place] ) [phone]          Thank you,  Luis Carlos Burrows, Triage RN [facility]  10:05 AM 2/6/2024

## 2024-02-06 NOTE — TELEPHONE ENCOUNTER
Patient calling for sameday appointment. She was seen in  on 2/4/24 and was instructed to be seen if her symptoms did not improve. Patient declined soonest available appointment with sameday provider on 2/7 and also declined RN triage. Patient states she is having fevers but cannot take any medication and cannot eat due to her sore throat.    Patient requested to see if PCP can fit her in today.

## 2024-02-06 NOTE — ADDENDUM NOTE
Addended by: NIDIA MORAN on: 2/6/2024 03:19 PM     Modules accepted: Orders     -FS qac and hs with [facility]SI

## 2024-02-06 NOTE — PROGRESS NOTES
"  Assessment & Plan     (J02.9) Sore throat  (primary encounter diagnosis)  Comment:   Pt c/o intermittent Sore throat and chills x 1 month. Fevers began about 2-3 days ago.  Was seen in  on 2/4 and treated with decadron solution at the clinic. This helped with pain for about 24 hrs.   Denies any cough, shortness of breath, wheezing, sinus congestion, rhinitis,   Also endorses fevers at home with tmax of 102.  Tylenol/ibuprofen providing mild relief of symptoms    She also c/o redness of bilateral eyes.   She does not wear contacts.  Denies any eye discharge, itchiness or severe eye pain.    I will send rx for MMW and will await results of lab tests   Plan: CBC with platelets, Mononucleosis screen,         Streptococcus A Rapid Screen w/Reflex to PCR -         Clinic Collect         BMI  Estimated body mass index is 26.28 kg/m  as calculated from the following:    Height as of this encounter: 1.588 m (5' 2.5\").    Weight as of this encounter: 66.2 kg (146 lb).         Hellen Jones is a 29 year old, presenting for the following health issues:  Pharyngitis        2/6/2024     1:37 PM   Additional Questions   Roomed by Sima     History of Present Illness       Reason for visit:  Sore through  Symptom onset:  3-7 days ago  Symptoms include:  Painful  Symptom intensity:  Severe  Symptom progression:  Worsening  Had these symptoms before:  Yes  Has tried/received treatment for these symptoms:  No  What makes it worse:  No  What makes it better:  No    She eats 2-3 servings of fruits and vegetables daily.She consumes 1 sweetened beverage(s) daily.She exercises with enough effort to increase her heart rate 30 to 60 minutes per day.  She exercises with enough effort to increase her heart rate 5 days per week.   She is taking medications regularly.       Sore throat   Seen in  2/4/24   Fevers 102  Neg strep           Objective    BP 94/57   Pulse 70   Temp 98.4  F (36.9  C) (Oral)   Resp 16   Ht 1.588 m (5' " "2.5\")   Wt 66.2 kg (146 lb)   LMP 01/15/2024 (Approximate)   SpO2 98%   BMI 26.28 kg/m    Body mass index is 26.28 kg/m .  Physical Exam  Constitutional:       General: She is not in acute distress.     Appearance: Normal appearance. She is not ill-appearing, toxic-appearing or diaphoretic.   HENT:      Head: Normocephalic and atraumatic.      Nose: No congestion.      Mouth/Throat:      Mouth: Mucous membranes are moist.      Pharynx: No oropharyngeal exudate or posterior oropharyngeal erythema.   Eyes:      Conjunctiva/sclera: Conjunctivae normal.   Cardiovascular:      Rate and Rhythm: Normal rate and regular rhythm.      Pulses: Normal pulses.      Heart sounds: Normal heart sounds.   Pulmonary:      Effort: Pulmonary effort is normal.      Breath sounds: Normal breath sounds.   Abdominal:      Palpations: Abdomen is soft.   Musculoskeletal:      Cervical back: No tenderness.   Lymphadenopathy:      Cervical: No cervical adenopathy.   Skin:     General: Skin is warm and dry.   Neurological:      Mental Status: She is alert and oriented to person, place, and time.   Psychiatric:         Mood and Affect: Mood normal.         Behavior: Behavior normal.         Thought Content: Thought content normal.         Judgment: Judgment normal.                    Signed Electronically by: CLAIRE Winchester CNP    "

## 2024-02-06 NOTE — TELEPHONE ENCOUNTER
Duplicate encounter.    Patient calls back to request appointment with same day provider, April Conway NP.    April is fully booked today, but Jacquie Moser has opening. Appointment scheduled.     Appointments in Next Year      Feb 06, 2024  1:30 PM  (Arrive by 1:10 PM)  Provider Visit with CLAIRE Ly CNP  [facility] ([facility] [place] ) [phone]          Thank you,  Luis Carlos Burrows, Triage RN [facility]  10:05 AM 2/6/2024

## 2024-02-08 ENCOUNTER — LAB (OUTPATIENT)
Dept: LAB | Facility: CLINIC | Age: 29
End: 2024-02-08
Payer: COMMERCIAL

## 2024-02-08 DIAGNOSIS — J02.9 SORE THROAT: ICD-10-CM

## 2024-02-08 LAB
ERYTHROCYTE [DISTWIDTH] IN BLOOD BY AUTOMATED COUNT: 11.8 % (ref 10–15)
HCT VFR BLD AUTO: 39.4 % (ref 35–47)
HGB BLD-MCNC: 13.5 G/DL (ref 11.7–15.7)
MCH RBC QN AUTO: 33.5 PG (ref 26.5–33)
MCHC RBC AUTO-ENTMCNC: 34.3 G/DL (ref 31.5–36.5)
MCV RBC AUTO: 98 FL (ref 78–100)
MONOCYTES NFR BLD AUTO: NEGATIVE %
PLATELET # BLD AUTO: 179 10E3/UL (ref 150–450)
RBC # BLD AUTO: 4.03 10E6/UL (ref 3.8–5.2)
WBC # BLD AUTO: 8.1 10E3/UL (ref 4–11)

## 2024-02-08 PROCEDURE — 85027 COMPLETE CBC AUTOMATED: CPT

## 2024-02-08 PROCEDURE — 36415 COLL VENOUS BLD VENIPUNCTURE: CPT

## 2024-02-08 PROCEDURE — 86308 HETEROPHILE ANTIBODY SCREEN: CPT

## 2024-02-16 ENCOUNTER — PATIENT OUTREACH (OUTPATIENT)
Dept: INTERNAL MEDICINE | Facility: CLINIC | Age: 29
End: 2024-02-16
Payer: COMMERCIAL

## 2024-02-16 PROBLEM — R87.810 ASCUS WITH POSITIVE HIGH RISK HPV CERVICAL: Status: ACTIVE | Noted: 2023-02-24

## 2024-02-16 PROBLEM — R87.610 ASCUS WITH POSITIVE HIGH RISK HPV CERVICAL: Status: ACTIVE | Noted: 2023-02-24

## 2024-02-16 NOTE — LETTER
February 16, 2024      Karen Soliz  [address]   [place] [address]        Dear MsSukhwinderHeydi,    This letter is to remind you that you are due for your follow-up Pap smear and Human Papillomavirus (HPV) test.    Please call [phone] to schedule your appointment at your earliest convenience.    If you have completed the appointment outside of the [facility] system, please have the records forwarded to our office. We will update your chart for your provider to review before your next annual wellness visit.     Thank you for choosing [facility]!      Sincerely,    Your [facility] Care Team

## 2024-06-27 ENCOUNTER — PATIENT OUTREACH (OUTPATIENT)
Dept: INTERNAL MEDICINE | Facility: CLINIC | Age: 29
End: 2024-06-27

## 2024-06-27 ENCOUNTER — HOSPITAL ENCOUNTER (EMERGENCY)
Facility: CLINIC | Age: 29
Discharge: HOME | End: 2024-06-27
Attending: STUDENT IN AN ORGANIZED HEALTH CARE EDUCATION/TRAINING PROGRAM | Admitting: STUDENT IN AN ORGANIZED HEALTH CARE EDUCATION/TRAINING PROGRAM
Payer: COMMERCIAL

## 2024-06-27 ENCOUNTER — APPOINTMENT (OUTPATIENT)
Dept: ULTRASOUND IMAGING | Facility: CLINIC | Age: 29
End: 2024-06-27
Attending: STUDENT IN AN ORGANIZED HEALTH CARE EDUCATION/TRAINING PROGRAM
Payer: COMMERCIAL

## 2024-06-27 VITALS
OXYGEN SATURATION: 99 % | HEART RATE: 51 BPM | SYSTOLIC BLOOD PRESSURE: 116 MMHG | HEIGHT: 63 IN | RESPIRATION RATE: 18 BRPM | TEMPERATURE: 97.6 F | DIASTOLIC BLOOD PRESSURE: 66 MMHG | BODY MASS INDEX: 25.86 KG/M2 | WEIGHT: 145.94 LBS

## 2024-06-27 DIAGNOSIS — N83.202 HEMORRHAGIC CYST OF LEFT OVARY: Primary | ICD-10-CM

## 2024-06-27 DIAGNOSIS — R10.32 LLQ ABDOMINAL PAIN: ICD-10-CM

## 2024-06-27 PROBLEM — G89.29 CHRONIC BILATERAL LOW BACK PAIN WITHOUT SCIATICA: Status: ACTIVE | Noted: 2019-05-30

## 2024-06-27 PROBLEM — M54.50 CHRONIC BILATERAL LOW BACK PAIN WITHOUT SCIATICA: Status: ACTIVE | Noted: 2019-05-30

## 2024-06-27 PROBLEM — D64.9 ANEMIA: Status: ACTIVE | Noted: 2024-06-27

## 2024-06-27 LAB
ACANTHOCYTES BLD QL SMEAR: NORMAL
ALBUMIN UR-MCNC: NEGATIVE MG/DL
ANION GAP SERPL CALCULATED.3IONS-SCNC: 13 MMOL/L (ref 7–15)
APPEARANCE UR: CLEAR
AUER BODIES BLD QL SMEAR: NORMAL
BASO STIPL BLD QL SMEAR: NORMAL
BASOPHILS # BLD AUTO: 0 10E3/UL (ref 0–0.2)
BASOPHILS NFR BLD AUTO: 1 %
BILIRUB UR QL STRIP: NEGATIVE
BITE CELLS BLD QL SMEAR: NORMAL
BLISTER CELLS BLD QL SMEAR: NORMAL
BUN SERPL-MCNC: 11.7 MG/DL (ref 6–20)
BURR CELLS BLD QL SMEAR: NORMAL
CALCIUM SERPL-MCNC: 8.9 MG/DL (ref 8.6–10)
CHLORIDE SERPL-SCNC: 103 MMOL/L (ref 98–107)
COLOR UR AUTO: YELLOW
CREAT SERPL-MCNC: 0.57 MG/DL (ref 0.51–0.95)
DACRYOCYTES BLD QL SMEAR: NORMAL
DEPRECATED HCO3 PLAS-SCNC: 21 MMOL/L (ref 22–29)
EGFRCR SERPLBLD CKD-EPI 2021: >90 ML/MIN/1.73M2
ELLIPTOCYTES BLD QL SMEAR: NORMAL
EOSINOPHIL # BLD AUTO: 0.1 10E3/UL (ref 0–0.7)
EOSINOPHIL NFR BLD AUTO: 2 %
ERYTHROCYTE [DISTWIDTH] IN BLOOD BY AUTOMATED COUNT: 12.1 % (ref 10–15)
FRAGMENTS BLD QL SMEAR: NORMAL
GIANT PLATELETS BLD QL SMEAR: NORMAL
GLUCOSE SERPL-MCNC: 81 MG/DL (ref 70–99)
GLUCOSE UR STRIP-MCNC: NEGATIVE MG/DL
HCG UR QL: NEGATIVE
HCT VFR BLD AUTO: 39.8 % (ref 35–47)
HGB BLD-MCNC: 13.3 G/DL (ref 11.7–15.7)
HGB C CRYSTALS: NORMAL
HGB UR QL STRIP: NEGATIVE
HOLD SPECIMEN: NORMAL
HOLD SPECIMEN: NORMAL
HOWELL-JOLLY BOD BLD QL SMEAR: NORMAL
IMM GRANULOCYTES # BLD: 0 10E3/UL
IMM GRANULOCYTES NFR BLD: 0 %
KETONES UR STRIP-MCNC: NEGATIVE MG/DL
LEUKOCYTE ESTERASE UR QL STRIP: NEGATIVE
LYMPHOCYTES # BLD AUTO: 1.9 10E3/UL (ref 0.8–5.3)
LYMPHOCYTES NFR BLD AUTO: 33 %
MCH RBC QN AUTO: 33.2 PG (ref 26.5–33)
MCHC RBC AUTO-ENTMCNC: 33.4 G/DL (ref 31.5–36.5)
MCV RBC AUTO: 99 FL (ref 78–100)
MONOCYTES # BLD AUTO: 0.6 10E3/UL (ref 0–1.3)
MONOCYTES NFR BLD AUTO: 10 %
MUCOUS THREADS #/AREA URNS LPF: PRESENT /LPF
NEUTROPHILS # BLD AUTO: 3.1 10E3/UL (ref 1.6–8.3)
NEUTROPHILS NFR BLD AUTO: 54 %
NEUTS HYPERSEG BLD QL SMEAR: NORMAL
NITRATE UR QL: NEGATIVE
NRBC # BLD AUTO: 0 10E3/UL
NRBC BLD AUTO-RTO: 0 /100
PATH REV: NORMAL
PH UR STRIP: 6 [PH] (ref 5–7)
PLAT MORPH BLD: NORMAL
PLATELET # BLD AUTO: 147 10E3/UL (ref 150–450)
POLYCHROMASIA BLD QL SMEAR: NORMAL
POTASSIUM SERPL-SCNC: 4.2 MMOL/L (ref 3.4–5.3)
RBC # BLD AUTO: 4.01 10E6/UL (ref 3.8–5.2)
RBC AGGLUT BLD QL: NORMAL
RBC MORPH BLD: NORMAL
RBC URINE: <1 /HPF
ROULEAUX BLD QL SMEAR: NORMAL
SICKLE CELLS BLD QL SMEAR: NORMAL
SMUDGE CELLS BLD QL SMEAR: NORMAL
SODIUM SERPL-SCNC: 137 MMOL/L (ref 135–145)
SP GR UR STRIP: 1.02 (ref 1–1.03)
SPHEROCYTES BLD QL SMEAR: NORMAL
SQUAMOUS EPITHELIAL: <1 /HPF
STOMATOCYTES BLD QL SMEAR: NORMAL
TARGETS BLD QL SMEAR: NORMAL
TOXIC GRANULES BLD QL SMEAR: NORMAL
UROBILINOGEN UR STRIP-MCNC: NORMAL MG/DL
VARIANT LYMPHS BLD QL SMEAR: NORMAL
WBC # BLD AUTO: 5.7 10E3/UL (ref 4–11)
WBC URINE: 1 /HPF

## 2024-06-27 PROCEDURE — 76856 US EXAM PELVIC COMPLETE: CPT

## 2024-06-27 PROCEDURE — 81025 URINE PREGNANCY TEST: CPT | Performed by: STUDENT IN AN ORGANIZED HEALTH CARE EDUCATION/TRAINING PROGRAM

## 2024-06-27 PROCEDURE — 80048 BASIC METABOLIC PNL TOTAL CA: CPT | Performed by: STUDENT IN AN ORGANIZED HEALTH CARE EDUCATION/TRAINING PROGRAM

## 2024-06-27 PROCEDURE — 81001 URINALYSIS AUTO W/SCOPE: CPT | Performed by: STUDENT IN AN ORGANIZED HEALTH CARE EDUCATION/TRAINING PROGRAM

## 2024-06-27 PROCEDURE — 99284 EMERGENCY DEPT VISIT MOD MDM: CPT | Mod: 25

## 2024-06-27 PROCEDURE — 85025 COMPLETE CBC W/AUTO DIFF WBC: CPT | Performed by: STUDENT IN AN ORGANIZED HEALTH CARE EDUCATION/TRAINING PROGRAM

## 2024-06-27 PROCEDURE — 36415 COLL VENOUS BLD VENIPUNCTURE: CPT | Performed by: STUDENT IN AN ORGANIZED HEALTH CARE EDUCATION/TRAINING PROGRAM

## 2024-06-27 PROCEDURE — 250N000013 HC RX MED GY IP 250 OP 250 PS 637: Performed by: STUDENT IN AN ORGANIZED HEALTH CARE EDUCATION/TRAINING PROGRAM

## 2024-06-27 PROCEDURE — 76830 TRANSVAGINAL US NON-OB: CPT

## 2024-06-27 RX ORDER — ONDANSETRON 4 MG/1
4 TABLET, ORALLY DISINTEGRATING ORAL EVERY 8 HOURS PRN
Qty: 10 TABLET | Refills: 0 | Status: SHIPPED | OUTPATIENT
Start: 2024-06-27 | End: 2024-06-30

## 2024-06-27 RX ORDER — KETOROLAC TROMETHAMINE 15 MG/ML
10 INJECTION, SOLUTION INTRAMUSCULAR; INTRAVENOUS ONCE
Status: DISCONTINUED | OUTPATIENT
Start: 2024-06-27 | End: 2024-06-27

## 2024-06-27 RX ORDER — NAPROXEN 250 MG/1
500 TABLET ORAL ONCE
Status: COMPLETED | OUTPATIENT
Start: 2024-06-27 | End: 2024-06-27

## 2024-06-27 RX ORDER — IBUPROFEN 200 MG
400 TABLET ORAL EVERY 8 HOURS PRN
Qty: 60 TABLET | Refills: 0 | Status: SHIPPED | OUTPATIENT
Start: 2024-06-27

## 2024-06-27 RX ADMIN — NAPROXEN 500 MG: 250 TABLET ORAL at 11:06

## 2024-06-27 ASSESSMENT — ACTIVITIES OF DAILY LIVING (ADL)
ADLS_ACUITY_SCORE: 33
ADLS_ACUITY_SCORE: 33
ADLS_ACUITY_SCORE: 35
ADLS_ACUITY_SCORE: 35

## 2024-06-27 ASSESSMENT — COLUMBIA-SUICIDE SEVERITY RATING SCALE - C-SSRS
2. HAVE YOU ACTUALLY HAD ANY THOUGHTS OF KILLING YOURSELF IN THE PAST MONTH?: NO
6. HAVE YOU EVER DONE ANYTHING, STARTED TO DO ANYTHING, OR PREPARED TO DO ANYTHING TO END YOUR LIFE?: NO
1. IN THE PAST MONTH, HAVE YOU WISHED YOU WERE DEAD OR WISHED YOU COULD GO TO SLEEP AND NOT WAKE UP?: NO

## 2024-06-27 NOTE — DISCHARGE INSTRUCTIONS
Thank you for allowing us to evaluate you today.  Follow up with primary care clinician  in 1 week as needed for reevaluation..  For pain, use acetaminophen (Tylenol) and ibuprofen.  Take the ondansetron as prescribed for nausea.   Please read the guidance provided with your discharge instructions.  Immediately return to the emergency department with any concerns.

## 2024-06-27 NOTE — ED TRIAGE NOTES
Pt arrives with c/o pelvic pain for 2 days. Pt reports dysuria as well. Pt has taken ibuprofen this morning. Pt on phone entire time of triage.      Triage Assessment (Adult)       Row Name 06/27/24 0908          Triage Assessment    Airway WDL WDL        Respiratory WDL    Respiratory WDL WDL        Skin Circulation/Temperature WDL    Skin Circulation/Temperature WDL WDL        Cardiac WDL    Cardiac WDL WDL        Peripheral/Neurovascular WDL    Peripheral Neurovascular WDL WDL        Cognitive/Neuro/Behavioral WDL    Cognitive/Neuro/Behavioral WDL WDL

## 2024-06-27 NOTE — TELEPHONE ENCOUNTER
Transitions of Care Outreach  Chief Complaint   Patient presents with    Hospital F/U     Hemorrhagic cyst of left ovary       Most Recent Admission Date: 6/27/2024   Most Recent Admission Diagnosis:      Most Recent Discharge Date: 6/27/2024   Most Recent Discharge Diagnosis: Hemorrhagic cyst of left ovary - N83.202  LLQ abdominal pain - R10.32     Transitions of Care Assessment    Discharge Assessment  How are you doing now that you are home?: Pt states she is doing somewhat better since she has been home.  How are your symptoms? (Red Flag symptoms escalate to triage hotline per guidelines): Improved  Do you know how to contact your clinic care team if you have future questions or changes to your health status? : Yes  Does the patient have their discharge instructions? : Yes  Does the patient have questions regarding their discharge instructions? : No  Were you started on any new medications or were there changes to any of your previous medications? : Yes  Does the patient have all of their medications?: Yes  Do you have questions regarding any of your medications? : No  Do you have all of your needed medical supplies or equipment (DME)?  (i.e. oxygen tank, CPAP, cane, etc.): Yes    Follow up Plan     Discharge Follow-Up  Discharge follow up appointment scheduled in alignment with recommended follow up timeframe or Transitions of Risk Category? (Low = within 30 days; Moderate= within 14 days; High= within 7 days): No (Pt requested to f/u with ob/gyn.  Transferred pt to ob/gyn to schedule a f/u ER appt for her ovarian cyst.)  Patient's follow up appointment not scheduled: Patient accepted scheduling support. Appt scheduled/requested per protocol.    No future appointments.    Outpatient Plan as outlined on AVS reviewed with patient.    For any urgent concerns, please contact our 24 hour nurse triage line: [phone] ([phone])       Quin Gonzáles RN

## 2024-06-27 NOTE — ED PROVIDER NOTES
"  Emergency Department Note      History of Present Illness     Chief Complaint  Pelvic Pain and Dysuria    HPI  Karen Soliz is a very pleasant 29 year old female presenting with pelvic pain and dysuria that started 3 days ago (24). Patient feels a pain in her lower left abdomen and pelvic area. She tears up when urinating and having bowel movements to the point where she doesn't want to go to the bathroom. She also experiences pain while coughing and turning to her left side. She does not experience nausea, except for during her first day of pain. No vomiting, urgency, diarrhea, dark stool, constipation, fever, chills, hematuria, constipation, frequency, skin redness or rash. She does not have a chance of STDs or pregnancy. Besides a  8 years ago, the patient is generally healthy and lifts weights regularly. Her latest workout was not vigorous. She takes ibuprofen, that provides relief for 2 hours with the last dose at 0400.    Independent Historian  None. Only the patient provided history.    Review of External Notes  I personally reviewed notes from the patient's clinic visit dated  24 . This provided me with information regarding patient's baseline medical problems.   Past Medical History   Medical History and Problem List  Past Medical History:   Diagnosis Date    Known health problems: none 2023       Medications  ibuprofen (ADVIL/MOTRIN) 200 MG tablet  ondansetron (ZOFRAN ODT) 4 MG ODT tab        Surgical History   Past Surgical History:   Procedure Laterality Date     SECTION       Physical Exam   Patient Vitals for the past 24 hrs:   BP Temp Temp src Pulse Resp SpO2 Height Weight   24 1304 116/66 -- -- 51 18 99 % -- --   24 0909 115/62 97.6  F (36.4  C) Temporal 68 18 98 % 1.6 m (5' 3\") 66.2 kg (145 lb 15.1 oz)      Physical Exam  Vitals and nursing note reviewed.   Constitutional:       General: She is not in acute distress.     Appearance: Normal appearance. " She is not diaphoretic.   HENT:      Mouth/Throat:      Mouth: Mucous membranes are moist.   Eyes:      General: No scleral icterus.     Conjunctiva/sclera: Conjunctivae normal.   Cardiovascular:      Rate and Rhythm: Normal rate.      Heart sounds: Normal heart sounds.   Pulmonary:      Effort: No respiratory distress.      Breath sounds: Normal breath sounds.   Abdominal:      General: Abdomen is flat.      Tenderness: There is abdominal tenderness (LLQ).   Skin:     General: Skin is warm and dry.      Findings: No rash.   Neurological:      Mental Status: She is alert.           Diagnostics   Lab Results   Labs Ordered and Resulted from Time of ED Arrival to Time of ED Departure   ROUTINE UA WITH MICROSCOPIC REFLEX TO CULTURE - Abnormal       Result Value    Color Urine Yellow      Appearance Urine Clear      Glucose Urine Negative      Bilirubin Urine Negative      Ketones Urine Negative      Specific Gravity Urine 1.024      Blood Urine Negative      pH Urine 6.0      Protein Albumin Urine Negative      Urobilinogen Urine Normal      Nitrite Urine Negative      Leukocyte Esterase Urine Negative      Mucus Urine Present (*)     RBC Urine <1      WBC Urine 1      Squamous Epithelials Urine <1     BASIC METABOLIC PANEL - Abnormal    Sodium 137      Potassium 4.2      Chloride 103      Carbon Dioxide (CO2) 21 (*)     Anion Gap 13      Urea Nitrogen 11.7      Creatinine 0.57      GFR Estimate >90      Calcium 8.9      Glucose 81     CBC WITH PLATELETS AND DIFFERENTIAL - Abnormal    WBC Count 5.7      RBC Count 4.01      Hemoglobin 13.3      Hematocrit 39.8      MCV 99      MCH 33.2 (*)     MCHC 33.4      RDW 12.1      Platelet Count 147 (*)     % Neutrophils 54      % Lymphocytes 33      % Monocytes 10      % Eosinophils 2      % Basophils 1      % Immature Granulocytes 0      NRBCs per 100 WBC 0      Absolute Neutrophils 3.1      Absolute Lymphocytes 1.9      Absolute Monocytes 0.6      Absolute Eosinophils 0.1       Absolute Basophils 0.0      Absolute Immature Granulocytes 0.0      Absolute NRBCs 0.0     HCG QUALITATIVE URINE - Normal    hCG Urine Qualitative Negative     RBC AND PLATELET MORPHOLOGY    RBC Morphology Confirmed RBC Indices      Platelet Assessment        Value: Automated Count Confirmed. Platelet morphology is normal.    Giant Platelets        Acanthocytes        Kenna Rods        Basophilic Stippling        Bite Cells        Blister Cells        Shoshone Cells        Elliptocytes        Hgb C Crystals        Barclay-Jolly Bodies        Hypersegmented Neutrophils        Polychromasia        RBC agglutination        RBC Fragments        Reactive Lymphocytes        Rouleaux        Sickle Cells        Smudge Cells        Spherocytes        Stomatocytes        Target Cells        Teardrop Cells        Toxic Neutrophils        Pathologist Review Comments (Blood)           Imaging  US Pelvis Cmplt w Transvag & Doppler LmtPel Duplex Limited   Preliminary Result   IMPRESSION:   1.  3.4 cm left ovarian hemorrhagic cyst. No evidence of torsion. No   follow-up needed per guidelines referenced below.      Management Recommendation:      Hemorrhagic Cyst      Reproductive Age Patient   <5 cm: No follow up.   >5 cm: Follow up US in 6-12 weeks.      Reference: Asymptomatic Ovarian and Other Adnexal Cysts Imaged at US.   Radiology: Volume 256: Number 3-September 2010          EKG   No ECG performed.       Independent Interpretation  None  ED Course    Medications Administered  Medications   naproxen (NAPROSYN) tablet 500 mg (500 mg Oral $Given 6/27/24 1106)       Procedures  Procedures   None performed    Discussion of Management  None    Social Determinants of Health adding to complexity of care  None.      ED Course  ED Course as of 06/27/24 1447   Thu Jun 27, 2024   1024 I obtained the history and examined the patient as above.     1258 I rechecked and updated the patient.        Medical Decision Making / Diagnosis   CMS  Diagnoses: None    MIPS     None    MDM  Patient presenting with left lower quadrant abdominal pain.  Vital signs reassuring.  Physical exam notable for tenderness in the left lower quadrant without guarding.  Considered differential including pregnancy, urinary tract infection, ovarian pathology, among others.  Workup was reassuring against UTI or pregnancy but did show hemorrhagic cyst of left ovary.  This was not significantly enlarged, there was not a significant amount of bleeding, and the patient is hemodynamically stable without anemia.  Feel she is appropriate for outpatient management with over-the-counter analgesics and follow-up with primary care clinician as needed for reevaluation.     Disposition  The patient was discharged.     ICD-10 Codes:    ICD-10-CM    1. Hemorrhagic cyst of left ovary  N83.202       2. LLQ abdominal pain  R10.32          Discharge Medications  Discharge Medication List as of 6/27/2024  1:05 PM        START taking these medications    Details   ibuprofen (ADVIL/MOTRIN) 200 MG tablet Take 2 tablets (400 mg) by mouth every 8 hours as needed for pain, Disp-60 tablet, R-0, E-Prescribe      ondansetron (ZOFRAN ODT) 4 MG ODT tab Take 1 tablet (4 mg) by mouth every 8 hours as needed, Disp-10 tablet, R-0, E-Prescribe           Scribe Disclosure:  I, Phoenix Peterson, am serving as a scribe at 1:04 PM on 6/27/2024 to document services personally performed by Kailash Bartlett MD based on my observations and the provider's statements to me.     Scribe Disclosure:  IDanna, am serving as the scribe  for Phoenix Peterson, the scribe trainee, at 2:00 PM on 6/27/2024 to document services personally performed by Kailash Bartlett MD based on our observations and the provider's statements to us.    [employer]        Kailash Bartlett MD  06/27/24 9310

## 2024-08-09 ENCOUNTER — APPOINTMENT (OUTPATIENT)
Dept: ULTRASOUND IMAGING | Facility: CLINIC | Age: 29
End: 2024-08-09
Attending: EMERGENCY MEDICINE
Payer: COMMERCIAL

## 2024-08-09 ENCOUNTER — APPOINTMENT (OUTPATIENT)
Dept: CT IMAGING | Facility: CLINIC | Age: 29
End: 2024-08-09
Attending: EMERGENCY MEDICINE
Payer: COMMERCIAL

## 2024-08-09 ENCOUNTER — HOSPITAL ENCOUNTER (EMERGENCY)
Facility: CLINIC | Age: 29
Discharge: HOME | End: 2024-08-10
Attending: EMERGENCY MEDICINE | Admitting: EMERGENCY MEDICINE
Payer: COMMERCIAL

## 2024-08-09 DIAGNOSIS — R10.2 PELVIC PAIN: ICD-10-CM

## 2024-08-09 DIAGNOSIS — N94.6 DYSMENORRHEA: ICD-10-CM

## 2024-08-09 DIAGNOSIS — B37.31 YEAST INFECTION OF THE VAGINA: ICD-10-CM

## 2024-08-09 LAB
ANION GAP SERPL CALCULATED.3IONS-SCNC: 11 MMOL/L (ref 7–15)
BASOPHILS # BLD AUTO: 0 10E3/UL (ref 0–0.2)
BASOPHILS NFR BLD AUTO: 1 %
BUN SERPL-MCNC: 11.5 MG/DL (ref 6–20)
CALCIUM SERPL-MCNC: 8.8 MG/DL (ref 8.8–10.4)
CHLORIDE SERPL-SCNC: 105 MMOL/L (ref 98–107)
CREAT SERPL-MCNC: 0.64 MG/DL (ref 0.51–0.95)
EGFRCR SERPLBLD CKD-EPI 2021: >90 ML/MIN/1.73M2
EOSINOPHIL # BLD AUTO: 0.4 10E3/UL (ref 0–0.7)
EOSINOPHIL NFR BLD AUTO: 5 %
ERYTHROCYTE [DISTWIDTH] IN BLOOD BY AUTOMATED COUNT: 12 % (ref 10–15)
GLUCOSE SERPL-MCNC: 112 MG/DL (ref 70–99)
HCG SERPL QL: NEGATIVE
HCO3 SERPL-SCNC: 22 MMOL/L (ref 22–29)
HCT VFR BLD AUTO: 36.4 % (ref 35–47)
HGB BLD-MCNC: 12.5 G/DL (ref 11.7–15.7)
HOLD SPECIMEN: NORMAL
IMM GRANULOCYTES # BLD: 0 10E3/UL
IMM GRANULOCYTES NFR BLD: 0 %
LYMPHOCYTES # BLD AUTO: 2.8 10E3/UL (ref 0.8–5.3)
LYMPHOCYTES NFR BLD AUTO: 39 %
MCH RBC QN AUTO: 34 PG (ref 26.5–33)
MCHC RBC AUTO-ENTMCNC: 34.3 G/DL (ref 31.5–36.5)
MCV RBC AUTO: 99 FL (ref 78–100)
MONOCYTES # BLD AUTO: 0.8 10E3/UL (ref 0–1.3)
MONOCYTES NFR BLD AUTO: 11 %
NEUTROPHILS # BLD AUTO: 3.2 10E3/UL (ref 1.6–8.3)
NEUTROPHILS NFR BLD AUTO: 45 %
NRBC # BLD AUTO: 0 10E3/UL
NRBC BLD AUTO-RTO: 0 /100
PLATELET # BLD AUTO: 169 10E3/UL (ref 150–450)
POTASSIUM SERPL-SCNC: 3.9 MMOL/L (ref 3.4–5.3)
RBC # BLD AUTO: 3.68 10E6/UL (ref 3.8–5.2)
SODIUM SERPL-SCNC: 138 MMOL/L (ref 135–145)
WBC # BLD AUTO: 7.1 10E3/UL (ref 4–11)

## 2024-08-09 PROCEDURE — 76830 TRANSVAGINAL US NON-OB: CPT

## 2024-08-09 PROCEDURE — 85025 COMPLETE CBC W/AUTO DIFF WBC: CPT | Performed by: EMERGENCY MEDICINE

## 2024-08-09 PROCEDURE — 36415 COLL VENOUS BLD VENIPUNCTURE: CPT | Performed by: EMERGENCY MEDICINE

## 2024-08-09 PROCEDURE — 250N000011 HC RX IP 250 OP 636: Performed by: EMERGENCY MEDICINE

## 2024-08-09 PROCEDURE — 80048 BASIC METABOLIC PNL TOTAL CA: CPT | Performed by: EMERGENCY MEDICINE

## 2024-08-09 PROCEDURE — 258N000003 HC RX IP 258 OP 636: Performed by: EMERGENCY MEDICINE

## 2024-08-09 PROCEDURE — 96361 HYDRATE IV INFUSION ADD-ON: CPT

## 2024-08-09 PROCEDURE — 250N000013 HC RX MED GY IP 250 OP 250 PS 637: Performed by: EMERGENCY MEDICINE

## 2024-08-09 PROCEDURE — 96374 THER/PROPH/DIAG INJ IV PUSH: CPT

## 2024-08-09 PROCEDURE — 96375 TX/PRO/DX INJ NEW DRUG ADDON: CPT

## 2024-08-09 PROCEDURE — 74176 CT ABD & PELVIS W/O CONTRAST: CPT

## 2024-08-09 PROCEDURE — 84703 CHORIONIC GONADOTROPIN ASSAY: CPT | Performed by: EMERGENCY MEDICINE

## 2024-08-09 PROCEDURE — 99285 EMERGENCY DEPT VISIT HI MDM: CPT | Mod: 25

## 2024-08-09 RX ORDER — KETOROLAC TROMETHAMINE 15 MG/ML
15 INJECTION, SOLUTION INTRAMUSCULAR; INTRAVENOUS ONCE
Status: COMPLETED | OUTPATIENT
Start: 2024-08-09 | End: 2024-08-09

## 2024-08-09 RX ORDER — OXYCODONE HYDROCHLORIDE 5 MG/1
5 TABLET ORAL ONCE
Status: COMPLETED | OUTPATIENT
Start: 2024-08-09 | End: 2024-08-09

## 2024-08-09 RX ORDER — ONDANSETRON 2 MG/ML
4 INJECTION INTRAMUSCULAR; INTRAVENOUS ONCE
Status: COMPLETED | OUTPATIENT
Start: 2024-08-09 | End: 2024-08-09

## 2024-08-09 RX ADMIN — KETOROLAC TROMETHAMINE 15 MG: 15 INJECTION, SOLUTION INTRAMUSCULAR; INTRAVENOUS at 23:27

## 2024-08-09 RX ADMIN — ONDANSETRON 4 MG: 2 INJECTION INTRAMUSCULAR; INTRAVENOUS at 22:38

## 2024-08-09 RX ADMIN — SODIUM CHLORIDE 1000 ML: 9 INJECTION, SOLUTION INTRAVENOUS at 22:37

## 2024-08-09 RX ADMIN — OXYCODONE HYDROCHLORIDE 5 MG: 5 TABLET ORAL at 23:27

## 2024-08-09 ASSESSMENT — COLUMBIA-SUICIDE SEVERITY RATING SCALE - C-SSRS
2. HAVE YOU ACTUALLY HAD ANY THOUGHTS OF KILLING YOURSELF IN THE PAST MONTH?: NO
1. IN THE PAST MONTH, HAVE YOU WISHED YOU WERE DEAD OR WISHED YOU COULD GO TO SLEEP AND NOT WAKE UP?: NO
6. HAVE YOU EVER DONE ANYTHING, STARTED TO DO ANYTHING, OR PREPARED TO DO ANYTHING TO END YOUR LIFE?: NO

## 2024-08-09 ASSESSMENT — ACTIVITIES OF DAILY LIVING (ADL)
ADLS_ACUITY_SCORE: 35
ADLS_ACUITY_SCORE: 33

## 2024-08-10 VITALS
BODY MASS INDEX: 25.93 KG/M2 | OXYGEN SATURATION: 100 % | DIASTOLIC BLOOD PRESSURE: 73 MMHG | HEART RATE: 60 BPM | TEMPERATURE: 98.3 F | SYSTOLIC BLOOD PRESSURE: 111 MMHG | WEIGHT: 151.9 LBS | HEIGHT: 64 IN | RESPIRATION RATE: 18 BRPM

## 2024-08-10 LAB
ALBUMIN UR-MCNC: NEGATIVE MG/DL
APPEARANCE UR: ABNORMAL
BACTERIA #/AREA URNS HPF: ABNORMAL /HPF
BILIRUB UR QL STRIP: NEGATIVE
COLOR UR AUTO: ABNORMAL
GLUCOSE UR STRIP-MCNC: NEGATIVE MG/DL
HGB UR QL STRIP: ABNORMAL
KETONES UR STRIP-MCNC: NEGATIVE MG/DL
LEUKOCYTE ESTERASE UR QL STRIP: NEGATIVE
NITRATE UR QL: NEGATIVE
PH UR STRIP: 5.5 [PH] (ref 5–7)
RBC URINE: >182 /HPF
SP GR UR STRIP: 1.01 (ref 1–1.03)
SQUAMOUS EPITHELIAL: 1 /HPF
UROBILINOGEN UR STRIP-MCNC: NORMAL MG/DL
WBC URINE: 1 /HPF
YEAST #/AREA URNS HPF: ABNORMAL /HPF

## 2024-08-10 PROCEDURE — 81001 URINALYSIS AUTO W/SCOPE: CPT | Performed by: EMERGENCY MEDICINE

## 2024-08-10 RX ORDER — ONDANSETRON 4 MG/1
4 TABLET, ORALLY DISINTEGRATING ORAL EVERY 8 HOURS PRN
Qty: 10 TABLET | Refills: 0 | Status: SHIPPED | OUTPATIENT
Start: 2024-08-10 | End: 2024-08-13

## 2024-08-10 RX ORDER — FLUCONAZOLE 150 MG/1
TABLET ORAL
Qty: 2 TABLET | Refills: 0 | Status: SHIPPED | OUTPATIENT
Start: 2024-08-10 | End: 2024-08-13

## 2024-08-10 RX ORDER — HYDROCODONE BITARTRATE AND ACETAMINOPHEN 5; 325 MG/1; MG/1
1 TABLET ORAL EVERY 6 HOURS PRN
Qty: 8 TABLET | Refills: 0 | Status: SHIPPED | OUTPATIENT
Start: 2024-08-10 | End: 2024-08-13

## 2024-08-10 ASSESSMENT — ACTIVITIES OF DAILY LIVING (ADL): ADLS_ACUITY_SCORE: 35

## 2024-08-10 NOTE — ED PROVIDER NOTES
History     Chief Complaint:  Back Pain       HPI   Karen Soliz is a 29 year old female who presents with left-sided pelvic pain and back pain.  Patient states that she recently started her menstrual cycle.  She has had modest bleeding.  No concern for STD.  She states that with each menstrual cycle, she gets quite bad pelvic and back pain.  Pain is higher today and more severe in nature.  She tried ibuprofen at home without relief.  No fever.  Nausea but no vomiting.  Denies pregnancy.  History of ovarian cyst in 2024.  She does sometimes feel pressure with urinating however no dysuria urgency or frequency.      Review of External notes      Medications:    fluconazole (DIFLUCAN) 150 MG tablet  HYDROcodone-acetaminophen (NORCO) 5-325 MG tablet  ondansetron (ZOFRAN ODT) 4 MG ODT tab  ibuprofen (ADVIL/MOTRIN) 200 MG tablet        Past Medical History:    Past Medical History:   Diagnosis Date    Known health problems: none 2023       Past Surgical History:    Past Surgical History:   Procedure Laterality Date     SECTION            Physical Exam   Patient Vitals for the past 24 hrs:   BP Temp Temp src Pulse Resp SpO2 Height Weight   08/10/24 0045 115/74 -- -- 61 -- -- -- --   08/10/24 0032 -- -- -- -- -- 100 % -- --   08/10/24 0031 -- -- -- -- -- 100 % -- --   08/10/24 0030 119/80 -- -- 71 -- 99 % -- --   08/10/24 0016 -- -- -- -- -- 100 % -- --   08/10/24 0015 -- -- -- -- -- 100 % -- --   08/10/24 0014 -- -- -- -- -- 100 % -- --   08/10/24 0013 -- -- -- -- -- 100 % -- --   08/10/24 0012 -- -- -- -- -- 100 % -- --   24 -- -- -- -- -- 100 % -- --   24 -- -- -- -- -- 100 % -- --   24 -- -- -- -- -- 100 % -- --   24 -- -- -- -- -- 100 % -- --   24 119/78 -- -- 54 -- -- -- --   24 -- -- -- -- -- 100 % -- --   24 -- -- -- -- -- 99 % -- --   24 120/72 -- -- 53 -- 100 % -- --   24 (!) 127/96 98.3  F  "(36.8  C) Temporal 60 18 100 % 1.626 m (5' 4\") 68.9 kg (151 lb 14.4 oz)        Physical Exam    Gen: alert  CV: RRR,  Pulm: breath sounds equal, lungs clear  Abd: Soft, nontender in upper abd, LLQ tenderness  Back: no evidence of injury, no cva tenderness  MSK: no deformity, moves all extremities  Skin: no rash  Neuro: alert, appropriate conversation and interaction      Emergency Department Course   ECG:  No results found for this or any previous visit.    Imaging:  Abd/pelvis CT no contrast - Stone Protocol   Final Result   IMPRESSION:    1.  No acute findings or inflammatory changes in the abdomen or pelvis.         US Pelvis Cmplt w Transvag & Doppler LmtPel Duplex Limited   Final Result   IMPRESSION:     1.  Normal pelvic ultrasound.                      Laboratory:  Labs Ordered and Resulted from Time of ED Arrival to Time of ED Departure   ROUTINE UA WITH MICROSCOPIC - Abnormal       Result Value    Color Urine Straw      Appearance Urine Slightly Cloudy (*)     Glucose Urine Negative      Bilirubin Urine Negative      Ketones Urine Negative      Specific Gravity Urine 1.007      Blood Urine Large (*)     pH Urine 5.5      Protein Albumin Urine Negative      Urobilinogen Urine Normal      Nitrite Urine Negative      Leukocyte Esterase Urine Negative      Bacteria Urine Few (*)     Budding Yeast Urine Moderate (*)     RBC Urine >182 (*)     WBC Urine 1      Squamous Epithelials Urine 1     BASIC METABOLIC PANEL - Abnormal    Sodium 138      Potassium 3.9      Chloride 105      Carbon Dioxide (CO2) 22      Anion Gap 11      Urea Nitrogen 11.5      Creatinine 0.64      GFR Estimate >90      Calcium 8.8      Glucose 112 (*)    CBC WITH PLATELETS AND DIFFERENTIAL - Abnormal    WBC Count 7.1      RBC Count 3.68 (*)     Hemoglobin 12.5      Hematocrit 36.4      MCV 99      MCH 34.0 (*)     MCHC 34.3      RDW 12.0      Platelet Count 169      % Neutrophils 45      % Lymphocytes 39      % Monocytes 11      % " Eosinophils 5      % Basophils 1      % Immature Granulocytes 0      NRBCs per 100 WBC 0      Absolute Neutrophils 3.2      Absolute Lymphocytes 2.8      Absolute Monocytes 0.8      Absolute Eosinophils 0.4      Absolute Basophils 0.0      Absolute Immature Granulocytes 0.0      Absolute NRBCs 0.0     HCG QUALITATIVE PREGNANCY - Normal    hCG Serum Qualitative Negative                   Interventions:  Medications   ondansetron (ZOFRAN) injection 4 mg (4 mg Intravenous $Given 8/9/24 2238)   oxyCODONE (ROXICODONE) tablet 5 mg (5 mg Oral $Given 8/9/24 2327)   sodium chloride 0.9% BOLUS 1,000 mL (0 mLs Intravenous Stopped 8/10/24 0056)   ketorolac (TORADOL) injection 15 mg (15 mg Intravenous $Given 8/9/24 2327)        Impression & Plan        Medical Decision Making:  Patient presents for pelvic pain.  She has a history of dysmenorrhea.  Recent hemorrhagic left ovarian cyst in June of this year.  On my exam, patient has mild focal left lower abdominal tenderness.  She denies any concern for STI.  She has had no discharge.  No fever.  She declines testing for STDs.  CT of the abdomen was obtained which showed no evidence of acute intra-abdominal pathology and no evidence of renal stone.  Ultrasound of the pelvis was obtained which showed no acute pathology.  No signs of ovarian torsion.  Prior ovarian cyst has resolved.  Pain is much much improved after Zofran Toradol and oxycodone here.  Discussed need for close OB/GYN follow-up for further evaluation of dysmenorrhea.  OB/GYN contact information found.  Plan for scheduled Tylenol and ibuprofen with as needed Norco for breakthrough pain.  Zofran.  For nausea.  Return for fever increasing pain or other worsening.    [place]  reviewed and no Rx found    Urine returned without overt signs of UTI.  Yeast noted in urine.  Upon further review, patient states that she has been having some mild vaginal itching.  Will treat for some yeast vaginitis.  Declines STI  testing.    Disposition:  Discharge    Diagnosis:    ICD-10-CM    1. Dysmenorrhea  N94.6       2. Pelvic pain  R10.2       3. Yeast infection of the vagina  B37.31            Discharge Medications:  New Prescriptions    FLUCONAZOLE (DIFLUCAN) 150 MG TABLET    Take one tablet now, and one tablet in three days    HYDROCODONE-ACETAMINOPHEN (NORCO) 5-325 MG TABLET    Take 1 tablet by mouth every 6 hours as needed for severe pain    ONDANSETRON (ZOFRAN ODT) 4 MG ODT TAB    Take 1 tablet (4 mg) by mouth every 8 hours as needed for nausea or vomiting        Michelle Dowell  August 10, 2024         Michelle Dowell MD  08/10/24 0124

## 2024-08-10 NOTE — ED TRIAGE NOTES
"Pt reports pain from her shoulder down to her back. Pt reports abd pain and hx of ovarian cyst. Pt nauseated. Pt reports she \"always feels like this when she gets her menstrual cycle\"         "

## 2024-08-10 NOTE — DISCHARGE INSTRUCTIONS
Take ibuprofen 600 mg 3x per day as needed.  This will provide both pain control and fight against inflammation.    Take tylenol 1000mg 3x per day as needed.  Do not take more than 3000mg tylenol in 24 hours.    You were given a prescription for norco (hydrocodone and acetaminophen).  This is a strong, narcotic pain medication.  It may cause drowsiness and mild changes in cognition.  Do not drive or operate machinery while taking this medication.  Do not mix this medication with alcohol or other sedating medications.  This medication contains tylenol (acetaminophen) therefore do not take additional tylenol (acetaminophen) while taking norco.  This medication can cause constipation.  The use of over the counter laxatives and stool softeners can help prevent this.

## 2024-08-13 ENCOUNTER — TELEPHONE (OUTPATIENT)
Dept: INTERNAL MEDICINE | Facility: CLINIC | Age: 29
End: 2024-08-13
Payer: COMMERCIAL

## 2024-08-13 NOTE — TELEPHONE ENCOUNTER
Reason for Call:  Appointment Request    Patient requesting this type of appt:  not feeling well    Requested provider: Alayna Bonner    Reason patient unable to be scheduled: Not within requested timeframe    When does patient want to be seen/preferred time: 1-2 days    Comments: not feeling well    Okay to leave a detailed message?: No at Cell number on file:    Telephone Information:   Mobile [phone]       Call taken on 8/13/2024 at 11:03 AM by Chrissie Muñoz

## 2024-08-14 ENCOUNTER — OFFICE VISIT (OUTPATIENT)
Dept: INTERNAL MEDICINE | Facility: CLINIC | Age: 29
End: 2024-08-14
Payer: COMMERCIAL

## 2024-08-14 ENCOUNTER — ANCILLARY PROCEDURE (OUTPATIENT)
Dept: GENERAL RADIOLOGY | Facility: CLINIC | Age: 29
End: 2024-08-14
Payer: COMMERCIAL

## 2024-08-14 VITALS
WEIGHT: 147 LBS | DIASTOLIC BLOOD PRESSURE: 71 MMHG | HEART RATE: 89 BPM | SYSTOLIC BLOOD PRESSURE: 104 MMHG | BODY MASS INDEX: 25.1 KG/M2 | TEMPERATURE: 97.7 F | OXYGEN SATURATION: 99 % | RESPIRATION RATE: 16 BRPM | HEIGHT: 64 IN

## 2024-08-14 DIAGNOSIS — R10.84 ABDOMINAL PAIN, GENERALIZED: Primary | ICD-10-CM

## 2024-08-14 DIAGNOSIS — R10.2 PELVIC PAIN IN FEMALE: ICD-10-CM

## 2024-08-14 DIAGNOSIS — R10.84 ABDOMINAL PAIN, GENERALIZED: ICD-10-CM

## 2024-08-14 DIAGNOSIS — N89.8 VAGINAL ITCHING: ICD-10-CM

## 2024-08-14 LAB
BASOPHILS # BLD AUTO: 0 10E3/UL (ref 0–0.2)
BASOPHILS NFR BLD AUTO: 1 %
CLUE CELLS: ABNORMAL
EOSINOPHIL # BLD AUTO: 0.3 10E3/UL (ref 0–0.7)
EOSINOPHIL NFR BLD AUTO: 6 %
ERYTHROCYTE [DISTWIDTH] IN BLOOD BY AUTOMATED COUNT: 11.8 % (ref 10–15)
HCT VFR BLD AUTO: 41.1 % (ref 35–47)
HGB BLD-MCNC: 13.9 G/DL (ref 11.7–15.7)
IMM GRANULOCYTES # BLD: 0 10E3/UL
IMM GRANULOCYTES NFR BLD: 0 %
LYMPHOCYTES # BLD AUTO: 1.9 10E3/UL (ref 0.8–5.3)
LYMPHOCYTES NFR BLD AUTO: 32 %
MCH RBC QN AUTO: 33.5 PG (ref 26.5–33)
MCHC RBC AUTO-ENTMCNC: 33.8 G/DL (ref 31.5–36.5)
MCV RBC AUTO: 99 FL (ref 78–100)
MONOCYTES # BLD AUTO: 0.6 10E3/UL (ref 0–1.3)
MONOCYTES NFR BLD AUTO: 9 %
NEUTROPHILS # BLD AUTO: 3.3 10E3/UL (ref 1.6–8.3)
NEUTROPHILS NFR BLD AUTO: 53 %
PLATELET # BLD AUTO: 199 10E3/UL (ref 150–450)
RBC # BLD AUTO: 4.15 10E6/UL (ref 3.8–5.2)
TRICHOMONAS, WET PREP: ABNORMAL
WBC # BLD AUTO: 6.1 10E3/UL (ref 4–11)
WBC'S/HIGH POWER FIELD, WET PREP: ABNORMAL
YEAST, WET PREP: ABNORMAL

## 2024-08-14 PROCEDURE — 36415 COLL VENOUS BLD VENIPUNCTURE: CPT

## 2024-08-14 PROCEDURE — 85025 COMPLETE CBC W/AUTO DIFF WBC: CPT

## 2024-08-14 PROCEDURE — 87210 SMEAR WET MOUNT SALINE/INK: CPT

## 2024-08-14 PROCEDURE — 99214 OFFICE O/P EST MOD 30 MIN: CPT

## 2024-08-14 PROCEDURE — 74019 RADEX ABDOMEN 2 VIEWS: CPT | Mod: TC | Performed by: RADIOLOGY

## 2024-08-14 RX ORDER — SENNA AND DOCUSATE SODIUM 50; 8.6 MG/1; MG/1
1 TABLET, FILM COATED ORAL AT BEDTIME
Qty: 15 TABLET | Refills: 0 | Status: SHIPPED | OUTPATIENT
Start: 2024-08-14 | End: 2024-08-29

## 2024-08-14 RX ORDER — CLOTRIMAZOLE 1 %
CREAM (GRAM) TOPICAL 2 TIMES DAILY
Qty: 10 G | Refills: 0 | Status: SHIPPED | OUTPATIENT
Start: 2024-08-14 | End: 2024-08-19

## 2024-08-14 RX ORDER — POLYETHYLENE GLYCOL 3350 17 G/17G
1 POWDER, FOR SOLUTION ORAL 2 TIMES DAILY
Qty: 476 G | Refills: 0 | Status: SHIPPED | OUTPATIENT
Start: 2024-08-14 | End: 2024-08-28

## 2024-08-14 NOTE — PROGRESS NOTES
Assessment & Plan     Abdominal pain, generalized  Patient presents to the clinic for an ER follow-up regarding abdominal pain.  Patient did undergo a CT scan of her abdomen and an ultrasound of her pelvis in the emergency room which was insignificant for findings.  Patient did have yeast in her urine therefore she was treated with Diflucan.  Patient states that she wants to repeat the treatment.  We will obtain a wet prep to ensure that she does not have a yeast infection.  Wet prep was negative therefore no future Diflucan is needed.  I will order a CBC and get an abdominal x-ray as I suspect this is related to constipation.  After reviewing the patient's x-ray it is evident that there is moderate to large amount of stool burden.  I will have the patient do stool cleanout.  Patient was demanding a repeat CT scan which I discussed with her the risks of radiation exposure.  Patient then demanded an MRI.  Discussed with the patient that an MRI would not be necessary at this point.  - Wet prep - Clinic Collect  - CBC with platelets and differential; Future  - XR Abdomen 2 Views; Future  - CBC with platelets and differential    Pelvic pain in female  Patient reports pelvic pain in the female.  She did just recently finished her menstrual cycle.  We will repeat the wet prep to ensure that her yeast infection is resolved after Diflucan treatment.  - Wet prep - Clinic Collect  - CBC with platelets and differential; Future  - CBC with platelets and differential    Vaginal itching  Patient is reporting vaginal itching.  I do recommend that she refrain from shaving in her ALLY area.  I will give her a topical antifungal to help with the itching 2 times daily for 5 days.  - clotrimazole (LOTRIMIN) 1 % external cream; Apply topically 2 times daily for 5 days        MED REC REQUIRED  Post Medication Reconciliation Status:     BMI  Estimated body mass index is 25.23 kg/m  as calculated from the following:    Height as of this  "encounter: 1.626 m (5' 4\").    Weight as of this encounter: 66.7 kg (147 lb).   Weight management plan: Patient was referred to their PCP to discuss a diet and exercise plan.          Hellen Jones is a 29 year old, presenting for the following health issues:  Patient is here for ongoing abdominal pain. She was in the ER and the CT scan shows nothing acute. The CT scan was obtained August 9th.   left-sided pelvic pain and back pain. Patient states that she recently started her menstrual cycle. She has had modest bleeding. No concern for STD. She states that with each menstrual cycle, she gets quite bad pelvic and back pain. Pain is higher today and more severe in nature. She tried ibuprofen at home without relief. No fever. Nausea but no vomiting. Denies pregnancy. History of ovarian cyst in June 2024. She does sometimes feel pressure with urinating however no dysuria urgency or frequency.   Period is now done.   No urinary frequency. No pain.   She is itchy.   No discharge.   She has a follow-up appointment with OB next week.   Pain is still on the left side  She is pooping normally. Daily. Normal and soft.   She has not been sexually active for 4 years      ER F/U, Musculoskeletal Problem, and GI Problem      8/14/2024     8:24 AM   Additional Questions   Roomed by Amanda DUARTE CMA   Accompanied by son     Musculoskeletal Problem    GI Problem    History of Present Illness       Reason for visit:  Back pain, bad stomach pain    She eats 4 or more servings of fruits and vegetables daily.She consumes 5 sweetened beverage(s) daily.She exercises with enough effort to increase her heart rate 30 to 60 minutes per day.  She exercises with enough effort to increase her heart rate 5 days per week.   She is taking medications regularly.                 Review of Systems  Constitutional, HEENT, cardiovascular, pulmonary, gi and gu systems are negative, except as otherwise noted.      Objective    /71 (BP Location: " "Right arm, Patient Position: Sitting, Cuff Size: Adult Regular)   Pulse 89   Temp 97.7  F (36.5  C) (Tympanic)   Resp 16   Ht 1.626 m (5' 4\")   Wt 66.7 kg (147 lb)   LMP 08/08/2024 (Exact Date)   SpO2 99%   Breastfeeding No   BMI 25.23 kg/m    Body mass index is 25.23 kg/m .  Physical Exam   GENERAL: alert and no distress  NECK: no adenopathy, no asymmetry, masses, or scars  RESP: lungs clear to auscultation - no rales, rhonchi or wheezes  CV: regular rate and rhythm, normal S1 S2, no S3 or S4, no murmur, click or rub, no peripheral edema  ABDOMEN: soft, nontender, no hepatosplenomegaly, no masses and bowel sounds normal   (female): normal female external genitalia, normal urethral meatus, normal vaginal mucosa  MS: no gross musculoskeletal defects noted, no edema    Xray - Reviewed and interpreted by me.  Moderate to large amount of stool burden        Signed Electronically by: CLAIRE Gilbert CNP    "

## 2024-10-18 ENCOUNTER — TELEPHONE (OUTPATIENT)
Dept: INTERNAL MEDICINE | Facility: CLINIC | Age: 29
End: 2024-10-18
Payer: COMMERCIAL

## 2024-10-18 NOTE — TELEPHONE ENCOUNTER
Order/Referral Request    Who is requesting: Patient    Orders being requested: Patient requesting a referral for blood work    Reason service is needed/diagnosis: Patient states that she is having reactions to food but does not know which foods, states that she wondering what to do or meds to take. Patient states she has been seen at dermatology but that they advised her to discuss with PCP.    When are orders needed by: ASAP    Has this been discussed with Provider: No    Does patient have a preference on a Group/Provider/Facility? PCP (Alayna Bonner)    Does patient have an appointment scheduled?: No    Where to send orders:  Call her to discuss    Okay to leave a detailed message?: Yes at Cell number on file:    Telephone Information:   Mobile [phone]

## 2024-10-18 NOTE — TELEPHONE ENCOUNTER
Called patient to gather more information about symptoms but patient refused to discuss further and is requesting Alayna Bonner to call her. This writer asked if this is something she has discussed with PCP in the past, and pt answered that she hasn't discussed it yet, but the symptom has been ongoing for a while now. Explained to patient that the provider needs to assess her first and determine next steps. Patient verbalized understanding and agreed to make an appointment.   Appointments in Next Year      Oct 24, 2024 9:30 AM  (Arrive by 9:10 AM)  Provider Visit with CLAIRE Parson CNP  [facility] ([facility] [place] ) [phone]

## 2024-10-24 ENCOUNTER — OFFICE VISIT (OUTPATIENT)
Dept: INTERNAL MEDICINE | Facility: CLINIC | Age: 29
End: 2024-10-24
Payer: COMMERCIAL

## 2024-10-24 VITALS
SYSTOLIC BLOOD PRESSURE: 110 MMHG | TEMPERATURE: 98.8 F | DIASTOLIC BLOOD PRESSURE: 75 MMHG | HEART RATE: 99 BPM | RESPIRATION RATE: 16 BRPM | BODY MASS INDEX: 24.94 KG/M2 | WEIGHT: 146.1 LBS | HEIGHT: 64 IN | OXYGEN SATURATION: 98 %

## 2024-10-24 DIAGNOSIS — L50.0 URTICARIA DUE TO FOOD ALLERGY: Primary | ICD-10-CM

## 2024-10-24 PROCEDURE — 99212 OFFICE O/P EST SF 10 MIN: CPT

## 2024-10-24 ASSESSMENT — ENCOUNTER SYMPTOMS: WHEEZING: 1

## 2024-10-24 NOTE — PROGRESS NOTES
"  Assessment & Plan     (L50.0) Urticaria due to food allergy  (primary encounter diagnosis)  Comment: Pt reports that every other day she is having a break out like acne on back or face. Pt states that after she eats eggs she breaks out, but pt hasn't had eggs for a long time.  Plan: Adult Allergy/Asthma  Referral        Referral sent                Subjective   Karen is a 29 year old, presenting for the following health issues: Pt reports that every other day she is having a break out like acne on back or face. Pt states that after she eats eggs she breaks out, but pt hasn't had eggs for a long time. Pt states that she has even stopped eating chicken because chicken comes from eggs. Explained to pt that the chicken doesn't have the same composition as an egg even though it came from an egg. Pt denies any problems with wheezing, no swelling in lips, no angioedema, no dysphagia. Pt states that the breakout is associated with dizziness and feels warm and flushed when the urticaria breaks out.  Allergies (She wants to be tested for allergies, especially food allergies.)      10/24/2024     9:29 AM   Additional Questions   Roomed by Brandi Cardona MA   Accompanied by Self     Allergies    History of Present Illness       Reason for visit:  Allergies   She is taking medications regularly.                 Review of Systems  Constitutional, HEENT, cardiovascular, pulmonary, gi and gu systems are negative, except as otherwise noted.      Objective    /75 (BP Location: Right arm, Patient Position: Sitting, Cuff Size: Adult Regular)   Pulse 99   Temp 98.8  F (37.1  C) (Oral)   Resp 16   Ht 1.626 m (5' 4\")   Wt 66.3 kg (146 lb 1.6 oz)   LMP 10/08/2024 (Exact Date)   SpO2 98%   BMI 25.08 kg/m    Body mass index is 25.08 kg/m .  Physical Exam   GENERAL: alert and no distress  NECK: no adenopathy, no asymmetry, masses, or scars  RESP: lungs clear to auscultation - no rales, rhonchi or wheezes  CV: regular " rate and rhythm, normal S1 S2, no S3 or S4, no murmur, click or rub, no peripheral edema  ABDOMEN: soft, nontender, no hepatosplenomegaly, no masses and bowel sounds normal  MS: no gross musculoskeletal defects noted, no edema  SKIN: closed comedones (pustules) on face  NEURO: Normal strength and tone, mentation intact and speech normal  PSYCH: mentation appears normal, affect normal/bright            Signed Electronically by: CLAIRE Ruggiero CNP

## 2024-10-28 ENCOUNTER — TELEPHONE (OUTPATIENT)
Dept: INTERNAL MEDICINE | Facility: CLINIC | Age: 29
End: 2024-10-28
Payer: COMMERCIAL

## 2024-10-28 NOTE — TELEPHONE ENCOUNTER
FYI - Status Update    Who is Calling: patient    Update: Patient upset that she can't get into allergy department sooner.  Is there anything you can help with?    Does caller want a call/response back: Yes     Okay to leave a detailed message?: Yes at Home number on file [phone] (home)

## 2024-10-29 NOTE — TELEPHONE ENCOUNTER
Patient should be directed to contact insurance Co and they can help locate options that are in network.    [facility]  [facility] Referrals

## 2024-10-29 NOTE — TELEPHONE ENCOUNTER
I called and checked with [facility]. Our next opening is in March of 2025. Can we refer this patient outside of [facility]?

## 2024-11-08 ENCOUNTER — OFFICE VISIT (OUTPATIENT)
Dept: ALLERGY | Facility: CLINIC | Age: 29
End: 2024-11-08
Payer: COMMERCIAL

## 2024-11-08 ENCOUNTER — LAB (OUTPATIENT)
Dept: LAB | Facility: CLINIC | Age: 29
End: 2024-11-08
Payer: COMMERCIAL

## 2024-11-08 VITALS
OXYGEN SATURATION: 100 % | HEART RATE: 57 BPM | BODY MASS INDEX: 25.09 KG/M2 | WEIGHT: 146.16 LBS | DIASTOLIC BLOOD PRESSURE: 75 MMHG | SYSTOLIC BLOOD PRESSURE: 113 MMHG

## 2024-11-08 DIAGNOSIS — R21 RASH: ICD-10-CM

## 2024-11-08 DIAGNOSIS — L50.0 URTICARIA DUE TO FOOD ALLERGY: ICD-10-CM

## 2024-11-08 DIAGNOSIS — L29.9 ITCHING: ICD-10-CM

## 2024-11-08 DIAGNOSIS — R21 RASH: Primary | ICD-10-CM

## 2024-11-08 PROCEDURE — 36415 COLL VENOUS BLD VENIPUNCTURE: CPT

## 2024-11-08 PROCEDURE — 86003 ALLG SPEC IGE CRUDE XTRC EA: CPT | Performed by: INTERNAL MEDICINE

## 2024-11-08 PROCEDURE — 99203 OFFICE O/P NEW LOW 30 MIN: CPT | Performed by: INTERNAL MEDICINE

## 2024-11-08 RX ORDER — CETIRIZINE HYDROCHLORIDE 10 MG/1
1 TABLET ORAL DAILY
COMMUNITY
Start: 2024-10-27 | End: 2024-11-26

## 2024-11-08 NOTE — LETTER
2024      Karen Soliz  [address]  [place] [address]      Dear Colleague,    Thank you for referring your patient, Karen Soliz, to the [facility]. Please see a copy of my visit note below.    Karen Soliz was seen in the Allergy Clinic at [facility].    Karen Soliz is a 29 year old female being seen today at the request of Quin Murphy APRN [facility] in consultation for a rash related to food ingestion.  It is difficult to determine if the rash is acne that she is referring to or hives.  She does describe significant itching.  She was in the urgent care recently and the urgent care provider referred her to allergy for food allergy testing.  The foods she is concerned about include dairy and eggs as well as soy and almond.  It is difficult to tell from her history if the reaction is occurring immediately after eating the food or randomly.  Zyrtec has helped with the itching.  She also does see dermatology for her acne..      Past Medical History:   Diagnosis Date     Known health problems: none 2023     Family History   Problem Relation Age of Onset     Diabetes Mother      Hypertension Mother      Hyperlipidemia Mother      Past Surgical History:   Procedure Laterality Date      SECTION         ENVIRONMENTAL HISTORY:   Pets inside the house include None.  Do you smoke cigarettes or other recreational drugs? No There is/are 0 smokers living in the house. The house does not have a damp basement.     SOCIAL HISTORY:   Karen is employed as Home [employer]. She lives with her son.      Review of Systems      Current Outpatient Medications:      cetirizine (ZYRTEC) 10 MG tablet, Take 1 tablet by mouth daily. (Patient not taking: Reported on 2024), Disp: , Rfl:   Allergies   Allergen Reactions     Eggs Or Egg-Derived Products [Chicken-Derived Products (Egg)] Rash         EXAM:   /75    Pulse 57   Wt 66.3 kg (146 lb 2.6 oz)   LMP 10/08/2024 (Exact Date)   SpO2 100%   BMI 25.09 kg/m      Physical Exam    Constitutional:       General: She is not in acute distress.     Appearance: Normal appearance. She is not ill-appearing.   HENT:      Head: Normocephalic and atraumatic.    Eyes:      General:         Right eye: No discharge.         Left eye: No discharge.   Skin:     Findings: She has 1 small papule on her chin.  Neurological:      General: No focal deficit present.      Mental Status: She is alert. Mental status is at baseline.   Psychiatric:         Mood and Affect: Mood normal.         Behavior: Behavior normal.        ASSESSMENT/PLAN:  Karen Soliz is a 29 year old female seen today for a rash.  She does have underlying acne from her description.  She does have itching and notes reviewed from the urgent care sound like they are concerned about hives.  There are no photographs that she has of the rash to determine if there was hives in addition to the acne.  She was referred for allergy testing for foods to see if foods are contributing to the urticaria or itching.  Again it is uncertain if hives are the correct underlying diagnoses.  Will order blood testing for foods per her request.  I think the likelihood of a food allergy is low.    She will be contacted with the results once available.  She can continue taking Zyrtec for itching relief.    Follow-up will be arranged if her blood test are positive.      Thank you for allowing me to participate in the care of Karen Soliz.      I spent 30 minutes on the date of the encounter doing chart review, history and exam, documentation and further coordination as noted above exclusive of separately reported interpretations    Harry Angel MD  Allergy/Immunology  [facility]      Again, thank you for allowing me to participate in the care of your patient.        Sincerely,        Harry Angel MD

## 2024-11-08 NOTE — PATIENT INSTRUCTIONS
Allergy Staff Appt Hours Shot Hours Location       Physician   Harry Angel MD      Support Staff   ADRIAN Schwartz RN, MA Emily J., MA      Mondays Tuesdays Thursdays and Fridays:      [place] 7-5      Wednesdays         Close                Mondays, Tuesdays and Fridays:  7:20 - 3:40              [facility]  [address]  [place] [address]  Allergy appointment  line: [phone]    Pulmonary Function Scheduling:  [place]: [phone]           Questions about cost of your care  For questions about your cost of your visit, procedure, lab or imaging contact: [employer] [facility] [phone] or visit:  [email or web address]    Prescription Assistance  If you need assistance with your prescriptions (cost, coverage, etc) please contact: [employer] Prescription Assistance Program [phone]    Important Scheduling Information  All visits for food challenges, medication/drug allergy testing, and drug challenges MUST be scheduled through the allergy clinic nurse. Please contact them via [employer] or by calling the clinic at [phone] and asking to speak with an allergy nurse. They will provide additional information and instructions for the appointment. Discontinue oral antihistamines 7 days prior to the appointment. Discontinue nasal and ocular antihistamines 1 day prior to the appointment.    Appointments for skin testing: Appointment will last approximately 45 minutes.  Please call the appointment line for your clinic to schedule.  Discontinue oral antihistamines 7 days prior to the appointment.  Discontinue nasal and ocular antihistamines 1 days prior to appointment.    Thank you for trusting us with your care. Please feel free to contact us with any questions or concerns you may have.

## 2024-11-08 NOTE — PROGRESS NOTES
Karen Soliz was seen in the Allergy Clinic at [facility].    Karen Soliz is a 29 year old female being seen today at the request of Quin Murphy APRN [facility] in consultation for a rash related to food ingestion.  It is difficult to determine if the rash is acne that she is referring to or hives.  She does describe significant itching.  She was in the urgent care recently and the urgent care provider referred her to allergy for food allergy testing.  The foods she is concerned about include dairy and eggs as well as soy and almond.  It is difficult to tell from her history if the reaction is occurring immediately after eating the food or randomly.  Zyrtec has helped with the itching.  She also does see dermatology for her acne..      Past Medical History:   Diagnosis Date    Known health problems: none 2023     Family History   Problem Relation Age of Onset    Diabetes Mother     Hypertension Mother     Hyperlipidemia Mother      Past Surgical History:   Procedure Laterality Date     SECTION         ENVIRONMENTAL HISTORY:   Pets inside the house include None.  Do you smoke cigarettes or other recreational drugs? No There is/are 0 smokers living in the house. The house does not have a damp basement.     SOCIAL HISTORY:   Karen is employed as Home [employer]. She lives with her son.      Review of Systems      Current Outpatient Medications:     cetirizine (ZYRTEC) 10 MG tablet, Take 1 tablet by mouth daily. (Patient not taking: Reported on 2024), Disp: , Rfl:   Allergies   Allergen Reactions    Eggs Or Egg-Derived Products [Chicken-Derived Products (Egg)] Rash         EXAM:   /75   Pulse 57   Wt 66.3 kg (146 lb 2.6 oz)   LMP 10/08/2024 (Exact Date)   SpO2 100%   BMI 25.09 kg/m      Physical Exam    Constitutional:       General: She is not in acute distress.     Appearance: Normal appearance. She is not ill-appearing.   HENT:       Head: Normocephalic and atraumatic.    Eyes:      General:         Right eye: No discharge.         Left eye: No discharge.   Skin:     Findings: She has 1 small papule on her chin.  Neurological:      General: No focal deficit present.      Mental Status: She is alert. Mental status is at baseline.   Psychiatric:         Mood and Affect: Mood normal.         Behavior: Behavior normal.        ASSESSMENT/PLAN:  Karen Soliz is a 29 year old female seen today for a rash.  She does have underlying acne from her description.  She does have itching and notes reviewed from the urgent care sound like they are concerned about hives.  There are no photographs that she has of the rash to determine if there was hives in addition to the acne.  She was referred for allergy testing for foods to see if foods are contributing to the urticaria or itching.  Again it is uncertain if hives are the correct underlying diagnoses.  Will order blood testing for foods per her request.  I think the likelihood of a food allergy is low.    She will be contacted with the results once available.  She can continue taking Zyrtec for itching relief.    Follow-up will be arranged if her blood test are positive.      Thank you for allowing me to participate in the care of Karen Soliz.      I spent 30 minutes on the date of the encounter doing chart review, history and exam, documentation and further coordination as noted above exclusive of separately reported interpretations    Harry Angel MD  Allergy/Immunology  [facility]

## 2024-11-08 NOTE — LETTER
November 11, 2024      Karen Soliz  [address]   [place] [address]        Dear ,    We are writing to inform you of your test results.    Your test results fall within the expected range(s) or remain unchanged from previous results. Test results were normal. Please continue with current treatment plan.     Resulted Orders   Allergen egg white IgE   Result Value Ref Range    Egg White IgE <0.10 <0.10 KU(A)/L      Comment:      Interpretation: None Detected    Narrative    ImmunoCAP Specific IgE Blood Test Quantitative Scoring  <0.10 kU(A)/L        Absent/undetectable  0.10-0.69 kU(A)/L    Low  0.70-3.49 kU(A)/L    Moderate  3.50-17.50 kU(A)/L   High  >17.50 kU(A)/L      Very High  Please note: In general, low IgE antibody levels indicate a low probability of clinical disease, whereas high antibody levels to an allergen show good correlation with clinical disease.   Allergen milk IgE   Result Value Ref Range    Milk, Cow IgE <0.10 <0.10 KU(A)/L      Comment:      Interpretation: None Detected    Narrative    ImmunoCAP Specific IgE Blood Test Quantitative Scoring  <0.10 kU(A)/L        Absent/undetectable  0.10-0.69 kU(A)/L    Low  0.70-3.49 kU(A)/L    Moderate  3.50-17.50 kU(A)/L   High  >17.50 kU(A)/L      Very High  Please note: In general, low IgE antibody levels indicate a low probability of clinical disease, whereas high antibody levels to an allergen show good correlation with clinical disease.   Allergen salmon IgE   Result Value Ref Range    [place] IgE <0.10 <0.10 KU(A)/L      Comment:      Interpretation: None Detected    Narrative    ImmunoCAP Specific IgE Blood Test Quantitative Scoring  <0.10 kU(A)/L        Absent/undetectable  0.10-0.69 kU(A)/L    Low  0.70-3.49 kU(A)/L    Moderate  3.50-17.50 kU(A)/L   High  >17.50 kU(A)/L      Very High  Please note: In general, low IgE antibody levels indicate a low probability of clinical disease, whereas high antibody levels to an allergen  show good correlation with clinical disease.   Allergen tuna IgE   Result Value Ref Range    Tuna IgE <0.10 <0.10 KU(A)/L      Comment:      Interpretation: None Detected    Narrative    ImmunoCAP Specific IgE Blood Test Quantitative Scoring  <0.10 kU(A)/L        Absent/undetectable  0.10-0.69 kU(A)/L    Low  0.70-3.49 kU(A)/L    Moderate  3.50-17.50 kU(A)/L   High  >17.50 kU(A)/L      Very High  Please note: In general, low IgE antibody levels indicate a low probability of clinical disease, whereas high antibody levels to an allergen show good correlation with clinical disease.   Allergen wheat IgE   Result Value Ref Range    Wheat IgE <0.10 <0.10 KU(A)/L      Comment:      Interpretation: None Detected    Narrative    ImmunoCAP Specific IgE Blood Test Quantitative Scoring  <0.10 kU(A)/L        Absent/undetectable  0.10-0.69 kU(A)/L    Low  0.70-3.49 kU(A)/L    Moderate  3.50-17.50 kU(A)/L   High  >17.50 kU(A)/L      Very High  Please note: In general, low IgE antibody levels indicate a low probability of clinical disease, whereas high antibody levels to an allergen show good correlation with clinical disease.   Allergen soybean IgE   Result Value Ref Range    Soybean IgE <0.10 <0.10 KU(A)/L      Comment:      Interpretation: None Detected    Narrative    ImmunoCAP Specific IgE Blood Test Quantitative Scoring  <0.10 kU(A)/L        Absent/undetectable  0.10-0.69 kU(A)/L    Low  0.70-3.49 kU(A)/L    Moderate  3.50-17.50 kU(A)/L   High  >17.50 kU(A)/L      Very High  Please note: In general, low IgE antibody levels indicate a low probability of clinical disease, whereas high antibody levels to an allergen show good correlation with clinical disease.   Allergen peanut IgE   Result Value Ref Range    Peanut IgE <0.10 <0.10 KU(A)/L      Comment:      Interpretation: None Detected    Narrative    ImmunoCAP Specific IgE Blood Test Quantitative Scoring  <0.10 kU(A)/L        Absent/undetectable  0.10-0.69 kU(A)/L     Low  0.70-3.49 kU(A)/L    Moderate  3.50-17.50 kU(A)/L   High  >17.50 kU(A)/L      Very High  Please note: In general, low IgE antibody levels indicate a low probability of clinical disease, whereas high antibody levels to an allergen show good correlation with clinical disease.   Allergen beef IgE   Result Value Ref Range    Beef IgE <0.10 <0.10 KU(A)/L      Comment:      Interpretation: None Detected    Narrative    ImmunoCAP Specific IgE Blood Test Quantitative Scoring  <0.10 kU(A)/L        Absent/undetectable  0.10-0.69 kU(A)/L    Low  0.70-3.49 kU(A)/L    Moderate  3.50-17.50 kU(A)/L   High  >17.50 kU(A)/L      Very High  Please note: In general, low IgE antibody levels indicate a low probability of clinical disease, whereas high antibody levels to an allergen show good correlation with clinical disease.   Allergen Coffee IgE   Result Value Ref Range    Coffee IgE <0.10 <0.10 KU(A)/L      Comment:      Interpretation: None Detected    Narrative    Analyte Specific Reagents (ASRs) are used in many laboratory tests necessary for standard medical care and generally do not require FDA approval.  This test was developed and its performance characteristics determined by [facility].  It has not been cleared or approved by the US Food and Drug Administration.    ImmunoCAP Specific IgE Blood Test Quantitative Scoring  <0.10 kU(A)/L        Absent/undetectable  0.10-0.69 kU(A)/L    Low  0.70-3.49 kU(A)/L    Moderate  3.50-17.50 kU(A)/L   High  >17.50 kU(A)/L      Very High  Please note: In general, low IgE antibody levels indicate a low probability of clinical disease, whereas high antibody levels to an allergen show good correlation with clinical disease.   Allergen almonds IgE   Result Value Ref Range    [place], Food IgE <0.10 <0.10 KU(A)/L      Comment:      Interpretation: None Detected    Narrative    ImmunoCAP Specific IgE Blood Test Quantitative Scoring  <0.10 kU(A)/L         Absent/undetectable  0.10-0.69 kU(A)/L    Low  0.70-3.49 kU(A)/L    Moderate  3.50-17.50 kU(A)/L   High  >17.50 kU(A)/L      Very High  Please note: In general, low IgE antibody levels indicate a low probability of clinical disease, whereas high antibody levels to an allergen show good correlation with clinical disease.       If you have any questions or concerns, please call the clinic at the number listed above.       Sincerely,      Harry Angel MD

## 2024-11-11 ENCOUNTER — TELEPHONE (OUTPATIENT)
Dept: ALLERGY | Facility: CLINIC | Age: 29
End: 2024-11-11
Payer: COMMERCIAL

## 2024-11-11 LAB
ALMOND IGE QN: <0.1 KU(A)/L
BEEF IGE QN: <0.1 KU(A)/L
COFFEE IGE QN: <0.1 KU(A)/L
COW MILK IGE QN: <0.1 KU(A)/L
EGG WHITE IGE QN: <0.1 KU(A)/L
PEANUT IGE QN: <0.1 KU(A)/L
SALMON IGE QN: <0.1 KU(A)/L
SOYBEAN IGE QN: <0.1 KU(A)/L
TUNA IGE QN: <0.1 KU(A)/L
WHEAT IGE QN: <0.1 KU(A)/L

## 2025-03-24 ENCOUNTER — HOSPITAL ENCOUNTER (EMERGENCY)
Facility: CLINIC | Age: 30
Discharge: HOME | End: 2025-03-24
Attending: STUDENT IN AN ORGANIZED HEALTH CARE EDUCATION/TRAINING PROGRAM | Admitting: STUDENT IN AN ORGANIZED HEALTH CARE EDUCATION/TRAINING PROGRAM
Payer: COMMERCIAL

## 2025-03-24 VITALS
OXYGEN SATURATION: 98 % | HEART RATE: 83 BPM | RESPIRATION RATE: 18 BRPM | BODY MASS INDEX: 25.1 KG/M2 | DIASTOLIC BLOOD PRESSURE: 94 MMHG | TEMPERATURE: 100.9 F | WEIGHT: 147.05 LBS | SYSTOLIC BLOOD PRESSURE: 146 MMHG | HEIGHT: 64 IN

## 2025-03-24 DIAGNOSIS — J10.1 INFLUENZA B: ICD-10-CM

## 2025-03-24 LAB
FLUAV RNA SPEC QL NAA+PROBE: NEGATIVE
FLUBV RNA RESP QL NAA+PROBE: POSITIVE
RSV RNA SPEC NAA+PROBE: NEGATIVE
S PYO DNA THROAT QL NAA+PROBE: NOT DETECTED
SARS-COV-2 RNA RESP QL NAA+PROBE: NEGATIVE

## 2025-03-24 PROCEDURE — 87637 SARSCOV2&INF A&B&RSV AMP PRB: CPT | Performed by: STUDENT IN AN ORGANIZED HEALTH CARE EDUCATION/TRAINING PROGRAM

## 2025-03-24 PROCEDURE — 99283 EMERGENCY DEPT VISIT LOW MDM: CPT | Performed by: STUDENT IN AN ORGANIZED HEALTH CARE EDUCATION/TRAINING PROGRAM

## 2025-03-24 PROCEDURE — 250N000013 HC RX MED GY IP 250 OP 250 PS 637: Performed by: STUDENT IN AN ORGANIZED HEALTH CARE EDUCATION/TRAINING PROGRAM

## 2025-03-24 PROCEDURE — 250N000013 HC RX MED GY IP 250 OP 250 PS 637: Performed by: EMERGENCY MEDICINE

## 2025-03-24 PROCEDURE — 250N000011 HC RX IP 250 OP 636: Performed by: STUDENT IN AN ORGANIZED HEALTH CARE EDUCATION/TRAINING PROGRAM

## 2025-03-24 PROCEDURE — 87651 STREP A DNA AMP PROBE: CPT | Performed by: STUDENT IN AN ORGANIZED HEALTH CARE EDUCATION/TRAINING PROGRAM

## 2025-03-24 RX ORDER — IBUPROFEN 200 MG
400 TABLET ORAL EVERY 8 HOURS PRN
Qty: 60 TABLET | Refills: 0 | Status: SHIPPED | OUTPATIENT
Start: 2025-03-24

## 2025-03-24 RX ORDER — ONDANSETRON 4 MG/1
4 TABLET, ORALLY DISINTEGRATING ORAL EVERY 8 HOURS PRN
Qty: 10 TABLET | Refills: 0 | Status: SHIPPED | OUTPATIENT
Start: 2025-03-24 | End: 2025-03-27

## 2025-03-24 RX ORDER — IBUPROFEN 200 MG
400 TABLET ORAL ONCE
Status: COMPLETED | OUTPATIENT
Start: 2025-03-24 | End: 2025-03-24

## 2025-03-24 RX ORDER — ACETAMINOPHEN 500 MG
1000 TABLET ORAL EVERY 4 HOURS PRN
Status: DISCONTINUED | OUTPATIENT
Start: 2025-03-24 | End: 2025-03-24 | Stop reason: HOSPADM

## 2025-03-24 RX ORDER — ONDANSETRON 4 MG/1
4 TABLET, ORALLY DISINTEGRATING ORAL ONCE
Status: COMPLETED | OUTPATIENT
Start: 2025-03-24 | End: 2025-03-24

## 2025-03-24 RX ADMIN — IBUPROFEN 400 MG: 200 TABLET, FILM COATED ORAL at 06:51

## 2025-03-24 RX ADMIN — ONDANSETRON 4 MG: 4 TABLET, ORALLY DISINTEGRATING ORAL at 06:51

## 2025-03-24 RX ADMIN — ACETAMINOPHEN 1000 MG: 500 TABLET, FILM COATED ORAL at 05:40

## 2025-03-24 ASSESSMENT — ACTIVITIES OF DAILY LIVING (ADL)
ADLS_ACUITY_SCORE: 41
ADLS_ACUITY_SCORE: 41

## 2025-03-24 NOTE — ED PROVIDER NOTES
"  Emergency Department Note      History of Present Illness     Chief Complaint   Fever and Shortness of Breath      HPI   Karen Soliz is a 30 year old female who presents to the ED with her brother for evaluation of fever and shortness of breath. The patient states she has had 4 days of shortness of a fever, chills, sore throat, dry cough, diffuse body aches, nausea, and headache. States she has felt short of breath for the past few hours with pleuritic upper back pain. She did not receive the most recent flu vaccine. Denies lower extremity edema, hormonal birth control, or recent surgery. No history DVT/PE.    Independent Historian   None    Review of External Notes   none    Past Medical History     Medical History and Problem List   Anemia  Chronic bilateral low back pain without sciatica     Medications   Norco  Ativan  Junel 1.  Oxycodone     Surgical History    section     Physical Exam     Patient Vitals for the past 24 hrs:   BP Temp Temp src Pulse Resp SpO2 Height Weight   25 0653 -- -- -- 83 18 98 % -- --   25 0535 (!) 146/94 100.9  F (38.3  C) Temporal 89 20 99 % 1.626 m (5' 4\") 66.7 kg (147 lb 0.8 oz)     Physical Exam  GENERAL: Patient well-appearing  HEAD: Atraumatic.  NECK: No rigidity  CV: RRR, no murmurs, rubs or gallops  PULM: CTAB with good aeration; no retractions, rales, rhonchi, or wheezing  ABD: Soft, nontender, nondistended, no guarding  DERM: No rash. Skin warm and dry  EXTREMITY: Moving all extremities without difficulty. No calf tenderness or peripheral edema       Diagnostics     Lab Results   Labs Ordered and Resulted from Time of ED Arrival to Time of ED Departure   INFLUENZA A/B, RSV AND SARS-COV2 PCR - Abnormal       Result Value    Influenza A PCR Negative      Influenza B PCR Positive (*)     RSV PCR Negative      SARS CoV2 PCR Negative     GROUP A STREPTOCOCCUS PCR THROAT SWAB - Normal    Group A strep by PCR Not Detected         Imaging   No orders to " display     Independent Interpretation   None    ED Course      Medications Administered   Medications   acetaminophen (TYLENOL) tablet 1,000 mg (1,000 mg Oral $Given 3/24/25 0540)   ibuprofen (ADVIL/MOTRIN) tablet 400 mg (400 mg Oral $Given 3/24/25 0651)   ondansetron (ZOFRAN ODT) ODT tab 4 mg (4 mg Oral $Given 3/24/25 0651)       Procedures   Procedures     Discussion of Management   None    ED Course   ED Course as of 03/24/25 0804   Mon Mar 24, 2025   0650 I obtained history and performed a physical exam as noted above.        Additional Documentation  None    Medical Decision Making / Diagnosis     CMS Diagnoses: None    MIPS       None    MDM   Karen Soliz is a 30 year old female     Symptoms appear most consistent with influenza.  Differential diagnosis-considered pneumonia, PE, among others, however evaluation does not appear consistent with these etiologies.  Basic labs -not indicated.  Low risk by [place], do not think this is PE.  Vital signs reassuring.  Chest x-ray -not indicated.  Provided antipyretics and Zofran.  Repeat assessment, patient is improved and feels comfortable going home.  Outside of window for the Tamiflu.  Counseled patient on getting the flu vaccine next year.  I have evaluated the patient for acute medical emergencies and have clinically decided no further acute medical interventions are required. Reassessed multiple times and improving. Patient stable for discharge. All questions answered. Given strict return precautions. Patient content with plan. The differential diagnosis and treatment modalities were discussed thoroughly with the patient. Recommended PCP follow-up routinely.    Disposition   The patient was discharged.     Diagnosis     ICD-10-CM    1. Influenza B  J10.1            Discharge Medications   Discharge Medication List as of 3/24/2025  7:40 AM        START taking these medications    Details   ibuprofen (ADVIL/MOTRIN) 200 MG tablet Take 2 tablets (400 mg) by mouth  every 8 hours as needed for pain., Disp-60 tablet, R-0, E-Prescribe      ondansetron (ZOFRAN ODT) 4 MG ODT tab Take 1 tablet (4 mg) by mouth every 8 hours as needed., Disp-10 tablet, R-0, E-Prescribe               Scribe Disclosure:  Ruth CARRIZALES, am serving as a scribe at 6:14 AM on 3/24/2025 to document services personally performed by Naveen Scott MD based on my observations and the provider's statements to me.        Naveen Scott MD  03/24/25 0879

## 2025-03-24 NOTE — ED TRIAGE NOTES
Pt reports fever started on Thursday with decreased appetite and weakness. Pt reports shortness of breath started today.

## 2025-03-24 NOTE — DISCHARGE INSTRUCTIONS
Return to the emergency department if symptoms are worsening, become concerning, or for any other concerns. Follow-up with your doctor routinely.       Discharge Instructions  Influenza    You were diagnosed today with influenza or influenza like illness.  Influenza is a respiratory (breathing) illness caused by influenza A or B viruses.  Influenza causes five primary symptoms: fever, headache, muscle aches/fatigue/malaise, sore throat and cough.  These symptoms start one to four days after you have been around a person with this illness. Influenza is spread through sneezing and coughing and can live on surfaces for several days.  It is usually contagious for 5 days but in some cases up to 10 days and often affects several family members. If you have a family member who is less than 2 years old, older than 65 years old, pregnant or has a serious medical condition, they should be seen right away by a provider to decide if they should take preventative medications. Although influenza will make you feel very ill, most patients don t require any specific treatment. An antiviral medication might be prescribed for certain groups of patients (older patients, younger patients, and those with certain chronic medical problems).    Generally, every Emergency Department visit should have a follow-up clinic visit with either a primary or a specialty clinic/provider. Please follow-up as instructed by your emergency provider today.    Return to the Emergency Department if:  You have trouble breathing.  You develop pain in your chest.  You have signs of being dehydrated, such as dizziness or unable to urinate (pee) at least three times daily.  You are confused or severely weak.  You cannot stop vomiting (throwing up) or you cannot drink enough fluids.    In children, you should seek help if the child has any of the above or if child:  Has blue or purplish skin color.  Is so irritable that he or she does not want to be held.  Does  not have tears when crying (in infants) or does not urinate at least three times daily.  Does not wake up easily.    What can I do to help myself?  Rest.  Fluids -- Drink hydrating solutions such as Gatorade  or Pedialyte  as often as you can. If you are drinking enough, you should pass urine at least every eight hours.  Tylenol  (acetaminophen) and Advil  (ibuprofen) can relieve fever, headache, and muscle aches. Do not give aspirin to children under 18 years old.   Antiviral treatment -- Antiviral medicines do not make the flu symptoms go away immediately.  They have only been shown to make the symptoms go away 12 to 24 hours sooner than they would without treatment.     Antibiotics -- Antibiotics are NOT useful for treating viral illnesses such as influenza. Antibiotics should only be used if there is a bacterial complication of the flu such as bacterial pneumonia, ear infection, or sinusitis.  Because you were diagnosed with a flu like illness you are very contagious.  This means you cannot work, attend school or  for at least 24 hours or until you no longer have a fever.  If you were given a prescription for medicine here today, be sure to read all of the information (including the package insert) that comes with your prescription.  This will include important information about the medicine, its side effects, and any warnings that you need to know about.  The pharmacist who fills the prescription can provide more information and answer questions you may have about the medicine.  If you have questions or concerns that the pharmacist cannot address, please call or return to the Emergency Department.   Remember that you can always come back to the Emergency Department if you are not able to see your regular provider in the amount of time listed above, if you get any new symptoms, or if there is anything that worries you.

## 2025-03-27 ENCOUNTER — OFFICE VISIT (OUTPATIENT)
Dept: URGENT CARE | Facility: URGENT CARE | Age: 30
End: 2025-03-27
Payer: COMMERCIAL

## 2025-03-27 VITALS
WEIGHT: 149.2 LBS | OXYGEN SATURATION: 99 % | RESPIRATION RATE: 21 BRPM | DIASTOLIC BLOOD PRESSURE: 85 MMHG | TEMPERATURE: 98.5 F | BODY MASS INDEX: 25.61 KG/M2 | SYSTOLIC BLOOD PRESSURE: 123 MMHG | HEART RATE: 86 BPM

## 2025-03-27 DIAGNOSIS — J10.1 INFLUENZA B: Primary | ICD-10-CM

## 2025-03-27 DIAGNOSIS — R07.0 THROAT PAIN: ICD-10-CM

## 2025-03-27 LAB — DEPRECATED S PYO AG THROAT QL EIA: NEGATIVE

## 2025-03-27 RX ORDER — IBUPROFEN 800 MG/1
800 TABLET, FILM COATED ORAL EVERY 6 HOURS PRN
Qty: 28 TABLET | Refills: 0 | Status: SHIPPED | OUTPATIENT
Start: 2025-03-27 | End: 2025-04-03

## 2025-03-27 RX ORDER — LIDOCAINE HYDROCHLORIDE 20 MG/ML
15 SOLUTION OROPHARYNGEAL
Qty: 200 ML | Refills: 0 | Status: SHIPPED | OUTPATIENT
Start: 2025-03-27 | End: 2025-04-06

## 2025-03-27 RX ORDER — DEXTROMETHORPHAN POLISTIREX 30 MG/5ML
60 SUSPENSION ORAL 2 TIMES DAILY
Qty: 200 ML | Refills: 0 | Status: SHIPPED | OUTPATIENT
Start: 2025-03-27 | End: 2025-04-06

## 2025-03-27 RX ORDER — ACETAMINOPHEN 500 MG
1000 TABLET ORAL EVERY 6 HOURS PRN
Qty: 100 TABLET | Refills: 0 | Status: SHIPPED | OUTPATIENT
Start: 2025-03-27 | End: 2025-04-26

## 2025-03-27 NOTE — LETTER
March 27, 2025      Karen Soliz  [address]   [place] [facility] [address]        To Whom It May Concern:    Karen Soliz  was seen on 03/27/2025.  Please excuse her  until 03/30/2025 due to illness.        Sincerely,        MOOSE CHANDLER CNP    Electronically signed

## 2025-03-28 NOTE — PROGRESS NOTES
Urgent Care Clinic Visit    Chief Complaint   Patient presents with    Pharyngitis     Sore throat, cough, and hoarseness for the last week.               3/27/2025     7:20 PM   Additional Questions   Roomed by Ralph Baeza MA   Accompanied by None     No  Does the patient have a sore throat and either history of fever >100.4 in the previous 24 hours without a cough or recent exposure to a known case of strep throat? Yes

## 2025-03-28 NOTE — PROGRESS NOTES
ICD-10-CM    1. Influenza B  J10.1 lidocaine, viscous, (XYLOCAINE) 2 % solution     ibuprofen (ADVIL/MOTRIN) 800 MG tablet     acetaminophen (TYLENOL) 500 MG tablet      2. Throat pain  R07.0 Streptococcus A Rapid Screen w/Reflex to PCR - Clinic Collect     Group A Streptococcus PCR Throat Swab     lidocaine, viscous, (XYLOCAINE) 2 % solution     ibuprofen (ADVIL/MOTRIN) 800 MG tablet     acetaminophen (TYLENOL) 500 MG tablet      Optimize ibuprofen and Tylenol, viscous lidocaine as needed before meals.  Fluids and rest.  Delsym for cough suppression at night.  Mucinex as desired during the day. Recheck in 10 days if symptoms have not improved, sooner if they worsen.  Decongestant as needed.    Red flag warning signs and when to go to the emergency room discussed.  Reviewed potential adverse reactions to medications.    Labs:  Results for orders placed or performed in visit on 03/27/25 (from the past 24 hours)   Streptococcus A Rapid Screen w/Reflex to PCR - Clinic Collect    Specimen: Throat; Swab   Result Value Ref Range    Group A Strep antigen Negative Negative       SUBJECTIVE:   Karen Soliz is a 30 year old female presenting with a chief complaint of   Chief Complaint   Patient presents with    Pharyngitis     Sore throat, cough, and hoarseness for the last week.   .    Review of systems is negative except for as noted in the HPI.    OBJECTIVE  /85 (BP Location: Right arm, Patient Position: Sitting, Cuff Size: Adult Regular)   Pulse 86   Temp 98.5  F (36.9  C) (Oral)   Resp 21   Wt 67.7 kg (149 lb 3.2 oz)   LMP 02/28/2025 (Approximate)   SpO2 99%   BMI 25.61 kg/m        GENERAL: Alert, mild distress  SKIN: skin is clear, no rash or abnormal pigmentation  HEAD: The head is normocephalic.   EYES: The eyes are normal. The conjunctivae and cornea normal.   NECK: The neck is supple and thyroid is normal, no masses; LYMPH NODES: Bilateral tender cervical adenopathy is present  HENT: Bilateral  tympanic membranes and canals appear normal, nasal passages are swollen with clear rhinorrhea, pharynx is red but there is no tonsillar hypertrophy  LUNGS: The lung fields are clear to auscultation, no rales, rhonchi, wheezing or retractions  CV: Rhythm is regular. S1 and S2 are normal. No murmurs.  EXTREMITIES: Symmetric extremities no deformities    CLAIRE Ayala, CNP  [facility]    The use of Dragon/[employer] dictation services may have been used to construct the content in this note; any grammatical or spelling errors are non-intentional. Please contact the author of this note directly if you are in need of any clarification.

## 2025-03-28 NOTE — PATIENT INSTRUCTIONS
Discharge Instructions  Sore Throat  You were seen today for a sore throat.  Most sore throats are caused by a virus. Antibiotics do not help with viral infections, but you can fight off the virus on your own.  In this case, your sore throat would be treated with medications for your pain and fever.    Strep throat is a kind of sore throat caused by Group A streptococcus bacteria.  This type of sore throat is treated with antibiotics.  If you had a rapid test done today for strep throat and it did not show infection, we always do a culture. If the culture shows you have strep throat, we will call you and get you a prescription for antibiotics.    Return to the Emergency Department if:  If you have difficulty breathing.  If you are drooling because you are unable to swallow.  You become dehydrated due to difficulty drinking. Signs of dehydration include weakness, dry mouth, and urinating less than 3 times per day.  If you develop swelling of the neck or tongue.  If you develop a high fever with either headache or stiff neck.    Treatment:    Pain relief -- Non-prescription pain medications, such as Tylenol  (acetaminophen) or Motrin , Advil  (ibuprofen) are usually recommended for pain.  Do not use a medicine that you are allergic to, or if your doctor has told you not to use it.   If you have been given a narcotic such as Vicodin  (hydrocodone with acetaminophen), Percocet  (oxycodone with acetaminophen), codeine, do not drive for four hours after you have taken it.  If the narcotic contains Tylenol  (acetaminophen), do not take Tylenol  with it. All narcotics will cause constipation, so eat a high fiber diet.    If you have been placed on antibiotics, watch for signs of allergic reaction.  These include rash, lip swelling, difficulty breathing, wheezing, and dizziness.  If you develop any of these symptoms, stop the antibiotic immediately and go to an Emergency Department or Urgent Care for evaluation.   "  Probiotics: If you have been given an antibiotic, you may want to also take a probiotic pill or eat yogurt with live cultures. Probiotics have \"good bacteria\" to help your intestines stay healthy. Studies have shown that probiotics help prevent diarrhea and other intestine problems (including C. diff infection) when you take antibiotics. You can buy these without a prescription in the pharmacy section of the store.   If you were given a prescription for medicine here today, be sure to read all of the information (including the package insert) that comes with your prescription.  This will include important information about the medicine, its side effects, and any warnings that you need to know about.  The pharmacist who fills the prescription can provide more information and answer questions you may have about the medicine.  If you have questions or concerns that the pharmacist cannot address, please call or return to the Emergency Department.           Opioid Medication Information    Pain medications are among the most commonly prescribed medicines, so we are including this information for all our patients. If you did not receive pain medication or get a prescription for pain medicine, you can ignore it.     You may have been given a prescription for an opioid (narcotic) pain medicine and/or have received a pain medicine while here in the Emergency Department. These medicines can make you drowsy or impaired. You must not drive, operate dangerous equipment, or engage in any other dangerous activities while taking these medications. If you drive while taking these medications, you could be arrested for DUI, or driving under the influence. Do not drink any alcohol while you are taking these medications.     Opioid pain medications can cause addiction. If you have a history of chemical dependency of any type, you are at a higher risk of becoming addicted to pain medications.  Only take these prescribed medications to " treat your pain when all other options have been tried. Take it for as short a time and as few doses as possible. Store your pain pills in a secure place, as they are frequently stolen and provide a dangerous opportunity for children or visitors in your house to start abusing these powerful medications. We will not replace any lost or stolen medicine.  As soon as your pain is better, you should flush all your remaining medication.     Many prescription pain medications contain Tylenol  (acetaminophen), including Vicodin , Tylenol #3 , Norco , Lortab , and Percocet .  You should not take any extra pills of Tylenol  if you are using these prescription medications or you can get very sick.  Do not ever take more than 3000 mg of acetaminophen in any 24 hour period.    All opioids tend to cause constipation. Drink plenty of water and eat foods that have a lot of fiber, such as fruits, vegetables, prune juice, apple juice and high fiber cereal.  Take a laxative if you don t move your bowels at least every other day. Miralax , Milk of Magnesia, Colace , or Senna  can be used to keep you regular.      Remember that you can always come back to the Emergency Department if you are not able to see your regular doctor in the amount of time listed above, if you get any new symptoms, or if there is anything that worries you.

## 2025-04-30 ENCOUNTER — OFFICE VISIT (OUTPATIENT)
Dept: INTERNAL MEDICINE | Facility: CLINIC | Age: 30
End: 2025-04-30
Payer: COMMERCIAL

## 2025-04-30 ENCOUNTER — ANCILLARY PROCEDURE (OUTPATIENT)
Dept: GENERAL RADIOLOGY | Facility: CLINIC | Age: 30
End: 2025-04-30
Attending: INTERNAL MEDICINE
Payer: COMMERCIAL

## 2025-04-30 VITALS
DIASTOLIC BLOOD PRESSURE: 70 MMHG | HEART RATE: 79 BPM | WEIGHT: 147.6 LBS | BODY MASS INDEX: 25.2 KG/M2 | SYSTOLIC BLOOD PRESSURE: 112 MMHG | OXYGEN SATURATION: 98 % | HEIGHT: 64 IN | RESPIRATION RATE: 18 BRPM

## 2025-04-30 DIAGNOSIS — R10.9 STOMACH PAIN: ICD-10-CM

## 2025-04-30 DIAGNOSIS — R10.9 STOMACH PAIN: Primary | ICD-10-CM

## 2025-04-30 LAB
ALBUMIN SERPL BCG-MCNC: 4.2 G/DL (ref 3.5–5.2)
ALP SERPL-CCNC: 88 U/L (ref 40–150)
ALT SERPL W P-5'-P-CCNC: 42 U/L (ref 0–50)
ANION GAP SERPL CALCULATED.3IONS-SCNC: 10 MMOL/L (ref 7–15)
AST SERPL W P-5'-P-CCNC: 49 U/L (ref 0–45)
BILIRUB SERPL-MCNC: 0.3 MG/DL
BUN SERPL-MCNC: 9.5 MG/DL (ref 6–20)
CALCIUM SERPL-MCNC: 9.4 MG/DL (ref 8.8–10.4)
CHLORIDE SERPL-SCNC: 105 MMOL/L (ref 98–107)
CREAT SERPL-MCNC: 0.64 MG/DL (ref 0.51–0.95)
EGFRCR SERPLBLD CKD-EPI 2021: >90 ML/MIN/1.73M2
ERYTHROCYTE [DISTWIDTH] IN BLOOD BY AUTOMATED COUNT: 12.2 % (ref 10–15)
GLUCOSE SERPL-MCNC: 62 MG/DL (ref 70–99)
HCO3 SERPL-SCNC: 26 MMOL/L (ref 22–29)
HCT VFR BLD AUTO: 38 % (ref 35–47)
HGB BLD-MCNC: 13.3 G/DL (ref 11.7–15.7)
MCH RBC QN AUTO: 33.4 PG (ref 26.5–33)
MCHC RBC AUTO-ENTMCNC: 35 G/DL (ref 31.5–36.5)
MCV RBC AUTO: 96 FL (ref 78–100)
PLATELET # BLD AUTO: 200 10E3/UL (ref 150–450)
POTASSIUM SERPL-SCNC: 4 MMOL/L (ref 3.4–5.3)
PROT SERPL-MCNC: 7.5 G/DL (ref 6.4–8.3)
RBC # BLD AUTO: 3.98 10E6/UL (ref 3.8–5.2)
SODIUM SERPL-SCNC: 141 MMOL/L (ref 135–145)
WBC # BLD AUTO: 5.2 10E3/UL (ref 4–11)

## 2025-04-30 PROCEDURE — 80053 COMPREHEN METABOLIC PANEL: CPT | Performed by: INTERNAL MEDICINE

## 2025-04-30 PROCEDURE — 74019 RADEX ABDOMEN 2 VIEWS: CPT | Mod: TC | Performed by: RADIOLOGY

## 2025-04-30 PROCEDURE — 85027 COMPLETE CBC AUTOMATED: CPT | Performed by: INTERNAL MEDICINE

## 2025-04-30 PROCEDURE — 99213 OFFICE O/P EST LOW 20 MIN: CPT | Performed by: INTERNAL MEDICINE

## 2025-04-30 PROCEDURE — 3078F DIAST BP <80 MM HG: CPT | Performed by: INTERNAL MEDICINE

## 2025-04-30 PROCEDURE — 3074F SYST BP LT 130 MM HG: CPT | Performed by: INTERNAL MEDICINE

## 2025-04-30 PROCEDURE — 36415 COLL VENOUS BLD VENIPUNCTURE: CPT | Performed by: INTERNAL MEDICINE

## 2025-04-30 RX ORDER — OMEPRAZOLE 40 MG/1
40 CAPSULE, DELAYED RELEASE ORAL DAILY
Qty: 90 CAPSULE | Refills: 3 | Status: SHIPPED | OUTPATIENT
Start: 2025-04-30

## 2025-04-30 RX ORDER — NORETHINDRONE ACETATE AND ETHINYL ESTRADIOL AND FERROUS FUMARATE 1.5-30(21)
KIT ORAL
COMMUNITY
Start: 2025-03-10

## 2025-04-30 RX ORDER — FAMOTIDINE 20 MG/1
20 TABLET, FILM COATED ORAL 2 TIMES DAILY PRN
Qty: 60 TABLET | Refills: 0 | Status: SHIPPED | OUTPATIENT
Start: 2025-04-30

## 2025-04-30 NOTE — LETTER
April 30, 2025      Karen Soliz  [address]   [facility] [address]        Dear ,    We are writing to inform you of your test results.    Your test results fall within or near the expected ranges. Please continue with current treatment plan.    Resulted Orders   CBC with platelets   Result Value Ref Range    WBC Count 5.2 4.0 - 11.0 10e3/uL    RBC Count 3.98 3.80 - 5.20 10e6/uL    Hemoglobin 13.3 11.7 - 15.7 g/dL    Hematocrit 38.0 35.0 - 47.0 %    MCV 96 78 - 100 fL    MCH 33.4 (H) 26.5 - 33.0 pg    MCHC 35.0 31.5 - 36.5 g/dL    RDW 12.2 10.0 - 15.0 %    Platelet Count 200 150 - 450 10e3/uL   Comprehensive metabolic panel   Result Value Ref Range    Sodium 141 135 - 145 mmol/L    Potassium 4.0 3.4 - 5.3 mmol/L    Carbon Dioxide (CO2) 26 22 - 29 mmol/L    Anion Gap 10 7 - 15 mmol/L    Urea Nitrogen 9.5 6.0 - 20.0 mg/dL    Creatinine 0.64 0.51 - 0.95 mg/dL    GFR Estimate >90 >60 mL/min/1.73m2    Calcium 9.4 8.8 - 10.4 mg/dL    Chloride 105 98 - 107 mmol/L    Glucose 62 (L) 70 - 99 mg/dL    Alkaline Phosphatase 88 40 - 150 U/L    AST 49 (H) 0 - 45 U/L    ALT 42 0 - 50 U/L    Protein Total 7.5 6.4 - 8.3 g/dL    Albumin 4.2 3.5 - 5.2 g/dL    Bilirubin Total 0.3 <=1.2 mg/dL       If you have any questions or concerns, please call the clinic at the number listed above.       Sincerely,      Mellissa Chinchilla MD    Electronically signed

## 2025-04-30 NOTE — PROGRESS NOTES
"  ASSESSMENT/PLAN                                                      (R10.9) Stomach pain  (primary encounter diagnosis)  Plan: labs and abdominal x-ray series today to evaluate for potential organic causes of/contributors to stomach pain; H. pylori stool sample ordered - patient to complete when able; after submitting H. pylori stool sample, patient to start omeprazole 40 mg daily consistently; may use Pepcid twice a day as needed for acute relief of symptoms; if symptoms worsen, change, or do not improve, patient to contact MD.      Mellissa Chinchilla MD   [address] [address]  T: [phone], F: [phone]    SUBJECTIVE                                                      Karen Soliz is a very pleasant 30 year old female who presents with abdominal and back pain:    Symptoms started last week. Patient indicates epigastric area/left upper abdomen and left side of mid back.  Patient describes the pain as a burning and sharp sensation that is worse after meals. Associated nausea, but no vomiting. No constipation, diarrhea, melena, or hematochezia. Associated chills, but no subjective fevers or sweats. Tried 1% milk and ibuprofen with no improvement in symptoms.    No active medical problems.  Only abdominal surgery has been a .    OBJECTIVE                                                      /70   Pulse 79   Resp 18   Ht 1.626 m (5' 4\")   Wt 67 kg (147 lb 9.6 oz)   LMP 2025 (Approximate)   SpO2 98%   BMI 25.34 kg/m    Constitutional: well-appearing  Respiratory: normal respiratory effort; clear to auscultation bilaterally  Cardiovascular: regular rate and rhythm; no edema  Gastrointestinal: soft, mild epigastric tenderness to palpation, and non-distended; no organomegaly or masses  Musculoskeletal: normal gait and station  Psych: normal judgment and insight; normal mood and affect; recent and remote memory intact    ---    (Note " documentation was completed, in part, with [employer] voice-recognition software. Documentation was reviewed, but some grammatical, spelling, and word errors may remain.)

## 2025-05-08 ENCOUNTER — RESULTS FOLLOW-UP (OUTPATIENT)
Dept: INTERNAL MEDICINE | Facility: CLINIC | Age: 30
End: 2025-05-08
Payer: COMMERCIAL

## 2025-05-08 LAB — H PYLORI AG STL QL IA: NEGATIVE

## 2025-05-08 NOTE — LETTER
May 8, 2025      Karen Soliz  [address]   [facility] [address]        Dear ,    Your recent H Pylori testing came back as NEGATIVE.    If you have any questions or concerns, please call the clinic at the number listed above.       Sincerely,      Mellissa Chinchilla MD    Electronically signed

## 2025-05-15 NOTE — TELEPHONE ENCOUNTER
Pt called the clinic requesting result from 4/30/25.     Triage informed on results note. Pt verbalized understanding and has no questions or concerns.     Serafin Loco RN  [facility]

## 2025-07-23 ENCOUNTER — OFFICE VISIT (OUTPATIENT)
Dept: URGENT CARE | Facility: URGENT CARE | Age: 30
End: 2025-07-23
Payer: COMMERCIAL

## 2025-07-23 VITALS
HEART RATE: 57 BPM | DIASTOLIC BLOOD PRESSURE: 60 MMHG | BODY MASS INDEX: 25.16 KG/M2 | TEMPERATURE: 97.8 F | WEIGHT: 146.6 LBS | SYSTOLIC BLOOD PRESSURE: 96 MMHG | OXYGEN SATURATION: 100 % | RESPIRATION RATE: 18 BRPM

## 2025-07-23 DIAGNOSIS — B86 SCABIES: Primary | ICD-10-CM

## 2025-07-23 PROCEDURE — 3074F SYST BP LT 130 MM HG: CPT | Performed by: NURSE PRACTITIONER

## 2025-07-23 PROCEDURE — 99213 OFFICE O/P EST LOW 20 MIN: CPT | Performed by: NURSE PRACTITIONER

## 2025-07-23 PROCEDURE — 3078F DIAST BP <80 MM HG: CPT | Performed by: NURSE PRACTITIONER

## 2025-07-23 RX ORDER — PERMETHRIN 50 MG/G
CREAM TOPICAL
Qty: 60 G | Refills: 1 | Status: SHIPPED | OUTPATIENT
Start: 2025-07-23

## 2025-07-23 NOTE — PROGRESS NOTES
Urgent Care Clinic Visit    Chief Complaint   Patient presents with    Urgent Care    Rash     Rash locate on her arms, groin, on her neck, and torso ongoing for a week to 2 wks               7/23/2025    10:37 AM   Additional Questions   Roomed by Sary   Accompanied by jesús

## 2025-07-23 NOTE — PROGRESS NOTES
Chief Complaint   Patient presents with    Urgent Care    Rash     Rash locate on her arms, groin, on her neck, and torso ongoing for a week to 2 wks         ICD-10-CM    1. Scabies  B86 permethrin (ELIMITE) 5 % external cream      Reviewed need for deep cleaning and wandering.  Permethrin to treat infestation.  All other family members to be treated if they start to show any signs.  Follow-up in 2 weeks if symptoms have not improved.    Subjective     Karen Soliz is an 30 year old female who presents to clinic today for itching and rash on neck, folds of arms and legs and genital area for 1 week.  She thinks the rash in her neck may have started a couple of months ago but it is not positive but all other rash started just recently.  She has not tried any previous treatment.  No one at home has similar symptoms.  She denies any fever, chills, upper respiratory symptoms.  She has not traveled anywhere.      Objective    BP 96/60   Pulse 57   Temp 97.8  F (36.6  C) (Tympanic)   Resp 18   Wt 66.5 kg (146 lb 9.6 oz)   LMP 07/18/2025   SpO2 100%   BMI 25.16 kg/m    Nurses notes and VS have been reviewed.    Physical Exam       GENERAL APPEARANCE: healthy appearing, alert     EYES: PERRL, EOMI, sclera non-icteric     HENT: oral exam benign, mucus membranes intact, without ulcers or lesions     NECK: no adenopathy or asymmetry, thyroid normal to palpation     MS: extremities normal- no gross deformities noted; normal muscle tone.     SKIN: Burrowing tracks noted on arms and between fingers as well as neck      CLAIRE Chiu, CNP  [facility]    The use of Dragon/[employer] dictation services may have been used to construct the content in this note; any grammatical or spelling errors are non-intentional. Please contact the author of this note directly if you are in need of any clarification.